# Patient Record
Sex: FEMALE | Race: WHITE | Employment: OTHER | URBAN - METROPOLITAN AREA
[De-identification: names, ages, dates, MRNs, and addresses within clinical notes are randomized per-mention and may not be internally consistent; named-entity substitution may affect disease eponyms.]

---

## 2018-10-11 ENCOUNTER — HOSPITAL ENCOUNTER (INPATIENT)
Facility: CLINIC | Age: 68
LOS: 3 days | Discharge: HOME | DRG: 178 | End: 2018-10-14
Attending: INTERNAL MEDICINE | Admitting: INTERNAL MEDICINE
Payer: MEDICARE

## 2018-10-11 ENCOUNTER — HOSPITAL ENCOUNTER (EMERGENCY)
Facility: CLINIC | Age: 68
Discharge: ANOTHER HOSPITAL | End: 2018-10-11
Attending: EMERGENCY MEDICINE | Admitting: EMERGENCY MEDICINE
Payer: MEDICARE

## 2018-10-11 ENCOUNTER — APPOINTMENT (OUTPATIENT)
Dept: CT IMAGING | Facility: CLINIC | Age: 68
End: 2018-10-11
Attending: EMERGENCY MEDICINE
Payer: MEDICARE

## 2018-10-11 VITALS
OXYGEN SATURATION: 98 % | RESPIRATION RATE: 16 BRPM | TEMPERATURE: 97.5 F | SYSTOLIC BLOOD PRESSURE: 126 MMHG | WEIGHT: 110 LBS | DIASTOLIC BLOOD PRESSURE: 62 MMHG

## 2018-10-11 DIAGNOSIS — J98.4 CAVITATING MASS OF LUNG: ICD-10-CM

## 2018-10-11 DIAGNOSIS — R91.8 LUNG MASS: ICD-10-CM

## 2018-10-11 DIAGNOSIS — J18.9 PNEUMONIA DUE TO INFECTIOUS ORGANISM, UNSPECIFIED LATERALITY, UNSPECIFIED PART OF LUNG: Primary | ICD-10-CM

## 2018-10-11 DIAGNOSIS — Z72.0 TOBACCO ABUSE DISORDER: ICD-10-CM

## 2018-10-11 LAB
ABO + RH BLD: NORMAL
ABO + RH BLD: NORMAL
ALBUMIN UR-MCNC: NEGATIVE MG/DL
ANION GAP SERPL CALCULATED.3IONS-SCNC: 9 MMOL/L (ref 3–14)
APPEARANCE UR: CLEAR
BACTERIA #/AREA URNS HPF: ABNORMAL /HPF
BASOPHILS # BLD AUTO: 0.1 10E9/L (ref 0–0.2)
BASOPHILS NFR BLD AUTO: 0.3 %
BILIRUB UR QL STRIP: NEGATIVE
BLD GP AB SCN SERPL QL: NORMAL
BLOOD BANK CMNT PATIENT-IMP: NORMAL
BUN SERPL-MCNC: 17 MG/DL (ref 7–30)
CALCIUM SERPL-MCNC: 9 MG/DL (ref 8.5–10.1)
CHLORIDE SERPL-SCNC: 99 MMOL/L (ref 94–109)
CO2 SERPL-SCNC: 25 MMOL/L (ref 20–32)
COLOR UR AUTO: YELLOW
CREAT SERPL-MCNC: 0.92 MG/DL (ref 0.52–1.04)
DIFFERENTIAL METHOD BLD: ABNORMAL
EOSINOPHIL # BLD AUTO: 0 10E9/L (ref 0–0.7)
EOSINOPHIL NFR BLD AUTO: 0.1 %
ERYTHROCYTE [DISTWIDTH] IN BLOOD BY AUTOMATED COUNT: 12.4 % (ref 10–15)
GFR SERPL CREATININE-BSD FRML MDRD: 61 ML/MIN/1.7M2
GLUCOSE SERPL-MCNC: 161 MG/DL (ref 70–99)
GLUCOSE UR STRIP-MCNC: NEGATIVE MG/DL
HCT VFR BLD AUTO: 40.4 % (ref 35–47)
HEMOCCULT STL QL: NEGATIVE
HGB BLD-MCNC: 13.3 G/DL (ref 11.7–15.7)
HGB UR QL STRIP: ABNORMAL
IMM GRANULOCYTES # BLD: 0.3 10E9/L (ref 0–0.4)
IMM GRANULOCYTES NFR BLD: 1.8 %
INR PPP: 1.09 (ref 0.86–1.14)
KETONES UR STRIP-MCNC: NEGATIVE MG/DL
LEUKOCYTE ESTERASE UR QL STRIP: ABNORMAL
LYMPHOCYTES # BLD AUTO: 1.9 10E9/L (ref 0.8–5.3)
LYMPHOCYTES NFR BLD AUTO: 10.1 %
MCH RBC QN AUTO: 30 PG (ref 26.5–33)
MCHC RBC AUTO-ENTMCNC: 32.9 G/DL (ref 31.5–36.5)
MCV RBC AUTO: 91 FL (ref 78–100)
MONOCYTES # BLD AUTO: 1.5 10E9/L (ref 0–1.3)
MONOCYTES NFR BLD AUTO: 8.2 %
MUCOUS THREADS #/AREA URNS LPF: PRESENT /LPF
NEUTROPHILS # BLD AUTO: 14.8 10E9/L (ref 1.6–8.3)
NEUTROPHILS NFR BLD AUTO: 79.5 %
NITRATE UR QL: NEGATIVE
NRBC # BLD AUTO: 0 10*3/UL
NRBC BLD AUTO-RTO: 0 /100
PH UR STRIP: 6 PH (ref 5–7)
PLATELET # BLD AUTO: 379 10E9/L (ref 150–450)
POTASSIUM SERPL-SCNC: 3.8 MMOL/L (ref 3.4–5.3)
PROCALCITONIN SERPL-MCNC: 0.21 NG/ML
RBC # BLD AUTO: 4.43 10E12/L (ref 3.8–5.2)
RBC #/AREA URNS AUTO: <1 /HPF (ref 0–2)
SODIUM SERPL-SCNC: 133 MMOL/L (ref 133–144)
SOURCE: ABNORMAL
SP GR UR STRIP: 1.01 (ref 1–1.03)
SPECIMEN EXP DATE BLD: NORMAL
SQUAMOUS #/AREA URNS AUTO: 1 /HPF (ref 0–1)
UROBILINOGEN UR STRIP-MCNC: 0 MG/DL (ref 0–2)
WBC # BLD AUTO: 18.6 10E9/L (ref 4–11)
WBC #/AREA URNS AUTO: 5 /HPF (ref 0–5)

## 2018-10-11 PROCEDURE — 82272 OCCULT BLD FECES 1-3 TESTS: CPT | Performed by: EMERGENCY MEDICINE

## 2018-10-11 PROCEDURE — 96365 THER/PROPH/DIAG IV INF INIT: CPT

## 2018-10-11 PROCEDURE — 12000000 ZZH R&B MED SURG/OB

## 2018-10-11 PROCEDURE — 81001 URINALYSIS AUTO W/SCOPE: CPT | Performed by: EMERGENCY MEDICINE

## 2018-10-11 PROCEDURE — 85025 COMPLETE CBC W/AUTO DIFF WBC: CPT | Performed by: EMERGENCY MEDICINE

## 2018-10-11 PROCEDURE — 96367 TX/PROPH/DG ADDL SEQ IV INF: CPT

## 2018-10-11 PROCEDURE — 87070 CULTURE OTHR SPECIMN AEROBIC: CPT | Performed by: INTERNAL MEDICINE

## 2018-10-11 PROCEDURE — 99223 1ST HOSP IP/OBS HIGH 75: CPT | Mod: AI | Performed by: INTERNAL MEDICINE

## 2018-10-11 PROCEDURE — 25000128 H RX IP 250 OP 636: Performed by: EMERGENCY MEDICINE

## 2018-10-11 PROCEDURE — 80048 BASIC METABOLIC PNL TOTAL CA: CPT | Performed by: EMERGENCY MEDICINE

## 2018-10-11 PROCEDURE — 84145 PROCALCITONIN (PCT): CPT | Performed by: EMERGENCY MEDICINE

## 2018-10-11 PROCEDURE — 87205 SMEAR GRAM STAIN: CPT | Performed by: INTERNAL MEDICINE

## 2018-10-11 PROCEDURE — 71260 CT THORAX DX C+: CPT

## 2018-10-11 PROCEDURE — 87040 BLOOD CULTURE FOR BACTERIA: CPT | Performed by: EMERGENCY MEDICINE

## 2018-10-11 PROCEDURE — 86850 RBC ANTIBODY SCREEN: CPT | Performed by: EMERGENCY MEDICINE

## 2018-10-11 PROCEDURE — 86901 BLOOD TYPING SEROLOGIC RH(D): CPT | Performed by: EMERGENCY MEDICINE

## 2018-10-11 PROCEDURE — 99285 EMERGENCY DEPT VISIT HI MDM: CPT | Mod: 25

## 2018-10-11 PROCEDURE — 86900 BLOOD TYPING SEROLOGIC ABO: CPT | Performed by: EMERGENCY MEDICINE

## 2018-10-11 PROCEDURE — 36415 COLL VENOUS BLD VENIPUNCTURE: CPT | Performed by: EMERGENCY MEDICINE

## 2018-10-11 PROCEDURE — 70450 CT HEAD/BRAIN W/O DYE: CPT

## 2018-10-11 PROCEDURE — 85610 PROTHROMBIN TIME: CPT | Performed by: EMERGENCY MEDICINE

## 2018-10-11 RX ORDER — HYDROCODONE BITARTRATE AND ACETAMINOPHEN 5; 325 MG/1; MG/1
1-2 TABLET ORAL EVERY 4 HOURS PRN
Status: DISCONTINUED | OUTPATIENT
Start: 2018-10-11 | End: 2018-10-14 | Stop reason: HOSPADM

## 2018-10-11 RX ORDER — LIDOCAINE 40 MG/G
CREAM TOPICAL
Status: DISCONTINUED | OUTPATIENT
Start: 2018-10-11 | End: 2018-10-14 | Stop reason: HOSPADM

## 2018-10-11 RX ORDER — POTASSIUM CL/LIDO/0.9 % NACL 10MEQ/0.1L
10 INTRAVENOUS SOLUTION, PIGGYBACK (ML) INTRAVENOUS
Status: DISCONTINUED | OUTPATIENT
Start: 2018-10-11 | End: 2018-10-14 | Stop reason: HOSPADM

## 2018-10-11 RX ORDER — ONDANSETRON 2 MG/ML
4 INJECTION INTRAMUSCULAR; INTRAVENOUS EVERY 6 HOURS PRN
Status: DISCONTINUED | OUTPATIENT
Start: 2018-10-11 | End: 2018-10-14 | Stop reason: HOSPADM

## 2018-10-11 RX ORDER — AMOXICILLIN 250 MG
2 CAPSULE ORAL 2 TIMES DAILY
Status: DISCONTINUED | OUTPATIENT
Start: 2018-10-11 | End: 2018-10-14 | Stop reason: HOSPADM

## 2018-10-11 RX ORDER — POTASSIUM CHLORIDE 1.5 G/1.58G
20-40 POWDER, FOR SOLUTION ORAL
Status: DISCONTINUED | OUTPATIENT
Start: 2018-10-11 | End: 2018-10-14 | Stop reason: HOSPADM

## 2018-10-11 RX ORDER — AMOXICILLIN 250 MG
1 CAPSULE ORAL 2 TIMES DAILY PRN
Status: DISCONTINUED | OUTPATIENT
Start: 2018-10-11 | End: 2018-10-14 | Stop reason: HOSPADM

## 2018-10-11 RX ORDER — PIPERACILLIN SODIUM, TAZOBACTAM SODIUM 3; .375 G/15ML; G/15ML
3.38 INJECTION, POWDER, LYOPHILIZED, FOR SOLUTION INTRAVENOUS EVERY 6 HOURS
Status: DISCONTINUED | OUTPATIENT
Start: 2018-10-11 | End: 2018-10-14 | Stop reason: HOSPADM

## 2018-10-11 RX ORDER — MAGNESIUM SULFATE HEPTAHYDRATE 40 MG/ML
4 INJECTION, SOLUTION INTRAVENOUS EVERY 4 HOURS PRN
Status: DISCONTINUED | OUTPATIENT
Start: 2018-10-11 | End: 2018-10-14 | Stop reason: HOSPADM

## 2018-10-11 RX ORDER — HYDROMORPHONE HYDROCHLORIDE 1 MG/ML
0.2 INJECTION, SOLUTION INTRAMUSCULAR; INTRAVENOUS; SUBCUTANEOUS
Status: DISCONTINUED | OUTPATIENT
Start: 2018-10-11 | End: 2018-10-14 | Stop reason: HOSPADM

## 2018-10-11 RX ORDER — SODIUM CHLORIDE 9 MG/ML
INJECTION, SOLUTION INTRAVENOUS CONTINUOUS
Status: DISCONTINUED | OUTPATIENT
Start: 2018-10-11 | End: 2018-10-12

## 2018-10-11 RX ORDER — ONDANSETRON 4 MG/1
4 TABLET, ORALLY DISINTEGRATING ORAL EVERY 6 HOURS PRN
Status: DISCONTINUED | OUTPATIENT
Start: 2018-10-11 | End: 2018-10-14 | Stop reason: HOSPADM

## 2018-10-11 RX ORDER — AMOXICILLIN 250 MG
2 CAPSULE ORAL 2 TIMES DAILY PRN
Status: DISCONTINUED | OUTPATIENT
Start: 2018-10-11 | End: 2018-10-14 | Stop reason: HOSPADM

## 2018-10-11 RX ORDER — ACETAMINOPHEN 325 MG/1
650 TABLET ORAL EVERY 4 HOURS PRN
Status: DISCONTINUED | OUTPATIENT
Start: 2018-10-11 | End: 2018-10-14 | Stop reason: HOSPADM

## 2018-10-11 RX ORDER — POTASSIUM CHLORIDE 1500 MG/1
20-40 TABLET, EXTENDED RELEASE ORAL
Status: DISCONTINUED | OUTPATIENT
Start: 2018-10-11 | End: 2018-10-14 | Stop reason: HOSPADM

## 2018-10-11 RX ORDER — AMOXICILLIN 250 MG
1 CAPSULE ORAL 2 TIMES DAILY
Status: DISCONTINUED | OUTPATIENT
Start: 2018-10-11 | End: 2018-10-14 | Stop reason: HOSPADM

## 2018-10-11 RX ORDER — POTASSIUM CHLORIDE 29.8 MG/ML
20 INJECTION INTRAVENOUS
Status: DISCONTINUED | OUTPATIENT
Start: 2018-10-11 | End: 2018-10-14 | Stop reason: HOSPADM

## 2018-10-11 RX ORDER — DIPHENHYDRAMINE HYDROCHLORIDE 50 MG/ML
25 INJECTION INTRAMUSCULAR; INTRAVENOUS ONCE
Status: DISCONTINUED | OUTPATIENT
Start: 2018-10-11 | End: 2018-10-11

## 2018-10-11 RX ORDER — VANCOMYCIN HYDROCHLORIDE 1 G/200ML
1000 INJECTION, SOLUTION INTRAVENOUS ONCE
Status: COMPLETED | OUTPATIENT
Start: 2018-10-11 | End: 2018-10-11

## 2018-10-11 RX ORDER — IOPAMIDOL 755 MG/ML
500 INJECTION, SOLUTION INTRAVASCULAR ONCE
Status: COMPLETED | OUTPATIENT
Start: 2018-10-11 | End: 2018-10-11

## 2018-10-11 RX ORDER — POTASSIUM CHLORIDE 7.45 MG/ML
10 INJECTION INTRAVENOUS
Status: DISCONTINUED | OUTPATIENT
Start: 2018-10-11 | End: 2018-10-14 | Stop reason: HOSPADM

## 2018-10-11 RX ORDER — NALOXONE HYDROCHLORIDE 0.4 MG/ML
.1-.4 INJECTION, SOLUTION INTRAMUSCULAR; INTRAVENOUS; SUBCUTANEOUS
Status: DISCONTINUED | OUTPATIENT
Start: 2018-10-11 | End: 2018-10-14 | Stop reason: HOSPADM

## 2018-10-11 RX ORDER — ACETAMINOPHEN 325 MG/1
325-650 TABLET ORAL EVERY 8 HOURS PRN
COMMUNITY

## 2018-10-11 RX ADMIN — VANCOMYCIN HYDROCHLORIDE 1000 MG: 1 INJECTION, SOLUTION INTRAVENOUS at 15:45

## 2018-10-11 RX ADMIN — SODIUM CHLORIDE 72 ML: 9 INJECTION, SOLUTION INTRAVENOUS at 13:40

## 2018-10-11 RX ADMIN — IOPAMIDOL 54 ML: 755 INJECTION, SOLUTION INTRAVENOUS at 13:40

## 2018-10-11 RX ADMIN — TAZOBACTAM SODIUM AND PIPERACILLIN SODIUM 3.38 G: 375; 3 INJECTION, SOLUTION INTRAVENOUS at 15:20

## 2018-10-11 ASSESSMENT — ENCOUNTER SYMPTOMS
DIZZINESS: 1
APPETITE CHANGE: 1
BACK PAIN: 1
BLOOD IN STOOL: 1
FATIGUE: 1
COUGH: 1
DIARRHEA: 1
VOMITING: 1
HEADACHES: 0

## 2018-10-11 NOTE — PHARMACY-ADMISSION MEDICATION HISTORY
Admission medication history interview status for this patient is complete. See [facility] admission navigator for allergy information, prior to admission medications and immunization status.     Medication history interview source(s):Patient and Family  Medication history resources (including written lists, pill bottles, clinic record):None  Primary pharmacy:-    Changes made to PTA medication list:  Added: tylenol  Deleted: -  Changed: -    Actions taken by pharmacist (provider contacted, etc):None     Additional medication history information:None    Medication reconciliation/reorder completed by provider prior to medication history? No    Do you take OTC medications (eg tylenol, ibuprofen, fish oil, eye/ear drops, etc)? yes(Y/N)    For patients on insulin therapy: no (Y/N)  Lantus/levemir/NPH/Mix 70/30 dose:   (Y/N) (see Med list for doses)   Sliding scale Novolog Y/N  If Yes, do you have a baseline novolog pre-meal dose:  units with meals  Patients eat three meals a day:   Y/N    How many episodes of hypoglycemia do you have per week: _______  How many missed doses do you have per week: ______  How many times do you check your blood glucose per day: _______  Do you have a Continuous glucose monitor (CGM)   Y/N (remind pt that not approved for hospital use)   Any Barriers to therapy - Be specific :  cost of medications, comfortable with giving injections (if applicable), comfortable and confident with current diabetes regimen: Y/N ______________      Prior to Admission medications    Medication Sig Last Dose Taking? Auth Provider   acetaminophen (TYLENOL) 325 MG tablet Take 325-650 mg by mouth every 8 hours as needed for mild pain 10/11/2018 at Unknown time Yes Unknown, Entered By History

## 2018-10-11 NOTE — IP AVS SNAPSHOT
MRN:[id number]                      After Visit Summary   10/11/2018    Stu Pastrana    MRN: [id number]           Thank you!     Thank you for choosing [facility] for your care. Our goal is always to provide you with excellent care. Hearing back from our patients is one way we can continue to improve our services. Please take a few minutes to complete the written survey that you may receive in the mail after you visit with us. Thank you!        Patient Information     Date Of Birth          1950        Designated Caregiver       Most Recent Value    Caregiver    Will someone help with your care after discharge? no      About your hospital stay     You were admitted on:  October 11, 2018 You last received care in the:  [place] [address] [facility]    You were discharged on:  October 14, 2018        Reason for your hospital stay       Pneumonia with cavitary lung lesion.                  Who to Call     For medical emergencies, please call 911.  For non-urgent questions about your medical care, please call your primary care provider or clinic, None          Attending Provider     Provider Specialty    Edwige Rausch MD Internal Medicine    Naima Higginbotham MD Internal Medicine       Primary Care Provider Fax #    Physician No Ref-Primary [phone]      After Care Instructions     Activity       Your activity upon discharge: activity as tolerated            Diet       Follow this diet upon discharge: Orders Placed This Encounter      Snacks/Supplements Adult: Boost Plus; With Meals      Combination Diet Regular Diet Adult                    Follow-up Appointments     Follow-up and recommended labs and tests       Follow up with primary care provider through [facility] within 7 days for hospital follow- up and for referral to Neurology at that time for right sided paresthesias; CBC recommended.  Follow up with Dr. Christophe Black, Pulmonology, in [place] or [place]  "([phone]) - 2-3 weeks.                  Pending Results     Date and Time Order Name Status Description    10/14/2018 0000 AFB Culture Non Blood In process     10/14/2018 0000 AFB Stain Non Blood In process     10/13/2018 0000 AFB Stain Non Blood In process     10/12/2018 1320 AFB Culture Non Blood In process     10/12/2018 1319 Fungal Antibodies In process     10/12/2018 1253 AFB Culture Non Blood In process     10/12/2018 0931 Quantiferon TB Gold Plus In process     10/12/2018 0752 AFB Stain Non Blood In process     10/11/2018 1425 Blood culture Preliminary     10/11/2018 1425 Blood culture Preliminary             Statement of Approval     Ordered          10/14/18 0926  I have reviewed and agree with all the recommendations and orders detailed in this document.  EFFECTIVE NOW     Approved and electronically signed by:  Flavio Royal MD             Admission Information     Date & Time Provider Department Dept. Phone    10/11/2018 Naima Higginbotham MD [place] [address] [facility] [phone]      Your Vitals Were     Blood Pressure Pulse Temperature Respirations Height Weight    124/53 (BP Location: Right arm) 82 98.1  F (36.7  C) (Oral) 18 1.575 m (5' 2\") 49.7 kg (109 lb 9.6 oz)    Pulse Oximetry BMI (Body Mass Index)                93% 20.05 kg/m2          [employer] Information     [employer] lets you send messages to your doctor, view your test results, renew your prescriptions, schedule appointments and more. To sign up, go to [email or web address] . Click on \"Log in\" on the left side of the screen, which will take you to the Welcome page. Then click on \"Sign up Now\" on the right side of the page.     You will be asked to enter the access code listed below, as well as some personal information. Please follow the directions to create your username and password.     Your access code is: [email or web address]  Expires: 2019  4:03 PM     Your access code will  in 90 days. If you need " help or a new code, please call your [facility] clinic or [phone].        Care EveryWhere ID     This is your Care EveryWhere ID. This could be used by other organizations to access your [facility] medical records  [id number]        Equal Access to Services     GIOVANI DELUNA : Hadii aad ku hadcristianmay Socamden, waaxda luqadaha, qaybta kaalmada adebrigidda, sandra mario perryjudah lyonsnajma dineroelsa erickson. So [facility] [phone].    ATENCIÓN: Si habla español, tiene a jimenez disposición servicios gratuitos de asistencia lingüística. Llame al [phone].    We comply with applicable federal civil rights laws and [place] laws. We do not discriminate on the basis of race, color, national origin, age, disability, sex, sexual orientation, or gender identity.               Review of your medicines      START taking        Dose / Directions    albuterol 108 (90 Base) MCG/ACT inhaler   Commonly known as:  PROAIR HFA        Dose:  2 puff   Inhale 2 puffs into the lungs every 4 hours as needed for shortness of breath / dyspnea or wheezing   Quantity:  1 Inhaler   Refills:  3       amoxicillin-clavulanate 875-125 MG per tablet   Commonly known as:  AUGMENTIN        Dose:  1 tablet   Take 1 tablet by mouth 2 times daily   Quantity:  60 tablet   Refills:  0       nicotine polacrilex 2 MG gum   Commonly known as:  NICORETTE        Dose:  2 mg   Place 1 each (2 mg) inside cheek every hour as needed for other (nicotine withdrawal symptoms)   Quantity:  100 tablet   Refills:  3         CONTINUE these medicines which have NOT CHANGED        Dose / Directions    acetaminophen 325 MG tablet   Commonly known as:  TYLENOL        Dose:  325-650 mg   Take 325-650 mg by mouth every 8 hours as needed for mild pain   Refills:  0            Where to get your medicines      These medications were sent to [facility] - [address]  [address] [address]     Phone:  [phone]     albuterol 108 (90 Base)  MCG/ACT inhaler    amoxicillin-clavulanate 875-125 MG per tablet    nicotine polacrilex 2 MG gum                Protect others around you: Learn how to safely use, store and throw away your medicines at www.disposemymeds.org.        ANTIBIOTIC INSTRUCTION     You've Been Prescribed an Antibiotic - Now What?  Your healthcare team thinks that you or your loved one might have an infection. Some infections can be treated with antibiotics, which are powerful, life-saving drugs. Like all medications, antibiotics have side effects and should only be used when necessary. There are some important things you should know about your antibiotic treatment.      Your healthcare team may run tests before you start taking an antibiotic.    Your team may take samples (e.g., from your blood, urine or other areas) to run tests to look for bacteria. These test can be important to determine if you need an antibiotic at all and, if you do, which antibiotic will work best.      Within a few days, your healthcare team might change or even stop your antibiotic.    Your team may start you on an antibiotic while they are working to find out what is making you sick.    Your team might change your antibiotic because test results show that a different antibiotic would be better to treat your infection.    In some cases, once your team has more information, they learn that you do not need an antibiotic at all. They may find out that you don't have an infection, or that the antibiotic you're taking won't work against your infection. For example, an infection caused by a virus can't be treated with antibiotics. Staying on an antibiotic when you don't need it is more likely to be harmful than helpful.      You may experience side effects from your antibiotic.    Like all medications, antibiotics have side effects. Some of these can be serious.    Let you healthcare team know if you have any known allergies when you are admitted to the  hospital.    One significant side effect of nearly all antibiotics is the risk of severe and sometimes deadly diarrhea caused by Clostridium difficile (C. Difficile). This occurs when a person takes antibiotics because some good germs are destroyed. Antibiotic use allows C. diificile to take over, putting patients at high risk for this serious infection.    As a patient or caregiver, it is important to understand your or your loved one's antibiotic treatment. It is especially important for caregivers to speak up when patients can't speak for themselves. Here are some important questions to ask your healthcare team.    What infection is this antibiotic treating and how do you know I have that infection?    What side effects might occur from this antibiotic?    How long will I need to take this antibiotic?    Is it safe to take this antibiotic with other medications or supplements (e.g., vitamins) that I am taking?     Are there any special directions I need to know about taking this antibiotic? For example, should I take it with food?    How will I be monitored to know whether my infection is responding to the antibiotic?    What tests may help to make sure the right antibiotic is prescribed for me?      Information provided by:  www.cdc.gov/getsmart  U.S. Department of Health and Human Services  Centers for disease Control and Prevention  National Center for Emerging and Zoonotic Infectious Diseases  Division of Healthcare Quality Promotion             Medication List: This is a list of all your medications and when to take them. Check marks below indicate your daily home schedule. Keep this list as a reference.      Medications           Morning Afternoon Evening Bedtime As Needed    acetaminophen 325 MG tablet   Commonly known as:  TYLENOL   Take 325-650 mg by mouth every 8 hours as needed for mild pain                                albuterol 108 (90 Base) MCG/ACT inhaler   Commonly known as:  PROAIR HFA   Inhale  2 puffs into the lungs every 4 hours as needed for shortness of breath / dyspnea or wheezing                                amoxicillin-clavulanate 875-125 MG per tablet   Commonly known as:  AUGMENTIN   Take 1 tablet by mouth 2 times daily                                nicotine polacrilex 2 MG gum   Commonly known as:  NICORETTE   Place 1 each (2 mg) inside cheek every hour as needed for other (nicotine withdrawal symptoms)

## 2018-10-11 NOTE — ED TRIAGE NOTES
Pt presents with multiple complaints. Pt states she has had cough x3 weeks, pt has hang blood tinged cough. Pt states she has had episode of black tarry stool as well. Pt also states was dizzy today, vomited once. ABCs intact.

## 2018-10-11 NOTE — ED PROVIDER NOTES
Patient was signed out to me pending admission.  She is a 68-year-old woman who was found to have a cavitary lung lesion in the setting of leukocytosis.  She does have hemoptysis and a chronic cough.  Had generalized weakness and decline in the last 2 weeks as well.  He was given vancomycin, Zosyn, blood cultures and blood work were obtained.  Patient needs a bronch to do definitive management of her cavitary lesion and the mass on her lungs.  She will be admitted to [facility].  She is remained stable.  Discussed with hospitalist who graciously accepted.     Lakia Nelson MD  10/11/18 1638

## 2018-10-11 NOTE — ED NOTES
Pt c/o burning at iv site.  Site is red and swollen but iv flushes well without pain and able to draw blood back easily.

## 2018-10-11 NOTE — IP AVS SNAPSHOT
[facility] [facility] [address] [facility]    [address] [place] [address]    Phone:  [phone]                                       After Visit Summary   10/11/2018    Stu Pastrana    MRN: [id number]           After Visit Summary Signature Page     I have received my discharge instructions, and my questions have been answered. I have discussed any challenges I see with this plan with the nurse or doctor.    ..........................................................................................................................................  Patient/Patient Representative Signature      ..........................................................................................................................................  Patient Representative Print Name and Relationship to Patient    ..................................................               ................................................  Date                                   Time    ..........................................................................................................................................  Reviewed by Signature/Title    ...................................................              ..............................................  Date                                               Time          22EPIC Rev 08/18

## 2018-10-11 NOTE — ED PROVIDER NOTES
History     Chief Complaint:  Multiple Complaints     HPI   Stu Pastrana is a 68 year old female, smoker with history of myocardial infarction, who presents to the emergency department today for evaluation of multiple complaints involving hemoptysis, right sided tingling, and bloody diarrhea. Patient asserts symptoms of right sided tingling that originated in her right big toe one evening two months ago, causing her to rouse from sleep due to severe pain, and she describes in minutes it radiated anteriorly through her right sided extremities up to her right shoulder and it has been a prevalent complaint for the past two months. Patient also asserts symptoms of hemoptysis where she endorses intermittent coughs with productive phlegm coated with blood (no clots) for the past two weeks. At initial onset, patient notes it was more red and she currently notes is more purple in color but she can taste and smell the blood during episodes of cough. Earlier today, patient states she felt dizzy and subsequently vomited chunky brown emesis. In addition, patient remarks episodes of bloody diarrhea approximately once or twice a day for four days with the last occurrence two days ago. Patient claims she had a couple brown crystal for bowel movement earlier today and comments a right sided abdominal discomfort. Overall, patient reports feeling fatigued and having diminished appetite as well as experiencing an episode of back pain for which she took Tylenol for earlier today. Patient denies any headache or history of blood thinners and alcohol consumption. Of note , patient notes an episode of major bleeding related to stomach ulcer due to ibuprofen many years ago.     Allergies:  No Known Drug Allergies    Medications:    Medications reviewed. No current medications.     Past Medical History:    Medical history reviewed. No pertinent medical history.    Past Surgical History:    Appendectomy     Family History:    Family history  reviewed. No pertinent family history.     Social History:  The patient was accompanied to the ED by daughter.  Smoking Status: Current Every Day Smoker   Packs/day: 1  Smokeless Tobacco: Never Used  Alcohol Use: Negative  Marital Status: Single     Review of Systems   Constitutional: Positive for appetite change (Decreased) and fatigue.   HENT:        Blood in phelgm   Respiratory: Positive for cough.    Gastrointestinal: Positive for blood in stool, diarrhea and vomiting.         Right sided abdominal discomfort   Musculoskeletal: Positive for back pain.   Neurological: Positive for dizziness. Negative for headaches.        Right sided tingling   All other systems reviewed and are negative.    Physical Exam     Patient Vitals for the past 24 hrs:   BP Temp Temp src Heart Rate Resp SpO2 Weight   10/11/18 1445 121/60 - - - - 96 % -   10/11/18 1330 - - - - - - 49.9 kg (110 lb)   10/11/18 1230 - - - - - 96 % -   10/11/18 1229 - - - - - 95 % -   10/11/18 1228 - - - - - 96 % -   10/11/18 1227 - - - - - 94 % -   10/11/18 1226 - - - - - 95 % -   10/11/18 1225 - - - - - 96 % -   10/11/18 1224 - - - - - 97 % -   10/11/18 1223 - - - - - 99 % -   10/11/18 1222 - - - - - 95 % -   10/11/18 1221 - - - - - 99 % -   10/11/18 1220 - - - - - 97 % -   10/11/18 1219 - - - - - 99 % -   10/11/18 1218 - - - - - 97 % -   10/11/18 1217 - - - - - 96 % -   10/11/18 1216 - - - - - 96 % -   10/11/18 1215 147/66 97.5  F (36.4  C) Oral 94 16 95 % -   10/11/18 1214 147/66 - - - - - -     Physical Exam    General: Patient is alert and interactive when I enter the room, thin appearing, appears older then state age  Head:  The scalp, face, and head appear normal  Eyes:  Conjunctivae are normal  ENT:    The nose is normal    Pinnae are normal    External acoustic canals are normal  Neck:  Trachea midline  CV:  Pulses are normal, RRR.    Resp:  No respiratory distress, CTAB, no murmurs    Abdomen:      Soft, non-tender, non-distended  Musc:  Normal  muscular tone    No major joint effusions    No asymmetric leg swelling  Rectal: No hemorrhoids noted, brown stool in rectal vault   Skin:  No rash or lesions noted  Neuro: Speech is normal and fluent. Face is symmetric.     Moving all extremities well. Sensation intact bilaterally. No pronator drift. No limb ataxia. Cranial nerves intact.   Psych:  Awake. Alert.  Normal affect.  Appropriate interactions.    Emergency Department Course     Imaging:  Radiology findings were communicated with the patient who voiced understanding of the findings.    CT Chest Pulmonary Embolism w Contrast  1. No CT evidence of pulmonary embolism.  2. Left lower lobe cavitary lesion abutting the left hilum with  adjacent interstitial thickening or fibrosis. Postobstructive  atelectasis or pneumonitis is also suspected posteriorly in the  lingula.  3. Right upper lobe 2.5 cm mass and adjacent interstitial thickening  or fibrosis.  4. Hepatic fatty infiltration--possible etiologies include consumption  of alcohol or excessive carbohydrate intake, especially  sugar/fructose. Metabolic syndrome commonly occurs in combination with  nonalcoholic fatty liver disease. Although often reversible,  nonalcoholic fatty liver disease can progress to steatohepatitis and  Cirrhosis.  Reading per radiology    CT Head w/o Contrast  Chronic changes. No evidence for intracranial hemorrhage  or any acute process.  JUSTEN CASTANEDA MD  Reading per radiology    Laboratory:  Laboratory findings were communicated with the patient who voiced understanding of the findings.    CBC: WBC 18.6 (H), HGB 13.3,   BMP: Glucose 161 (H) o/w WNL (Creatinine 0.92)  INR: 1.09  ABO/Rh type and screen: O  Stool occult blood: Negative   Blood culture x2: Pending       Interventions:  Zosyn 3.375 g IV   Vancocin 1000 mg IV     Emergency Department Course:    1215 Nursing notes and vitals reviewed.    1219  I performed an exam of the patient as documented above.     1238 IV was  inserted and blood was drawn for laboratory testing, results above.    1335 The patient was sent for a CT while in the emergency department, results above.     1420 Recheck and update.    1453 I spoke with Dr. Quiroz regarding patient's presentation, findings, and plan of care who states she will need a transfer to [facility].      The patient provided a urine sample here in the emergency department. This was sent for laboratory testing, findings above.     I personally reviewed the imaging and laboratory results with the patient and answered all related questions prior to admission.    Impression & Plan      Medical Decision Making:  Stu Pastrana is a 68 year old female who presents to the emergency department today for evaluation of hemoptysis, melena and poor appetite. Patient presents with multiple symptoms. In terms of her possible melena, a stool occult was done which was negative. Her Hgb is normal. Thus, it seems less likely to be a GI bleed. With her hemoptysis, this could be secondary to PE, PNA or malignancy. CBC revealed leukocytosis of 18. CTPE was done which revealed a large cavitary lesion on the left and right lung mass. With her leukocytosis and cough, it could be infectious. Zosyn and Vancomycin given. No signs of severe sepsis. Patient will need to be admitted to the hospital for further work-up however we do not have pulmonology. Plan to transfer to another hospital however signed out to Dr. Newman for deciding which hospital is available to take her.     Diagnosis:    ICD-10-CM   1. Lung mass R91.8     Disposition:   The patient is transferred to [facility] for further evaluation and treatment under the care of Dr. Omar Plascenciaibcharity Disclosure:  I, Juan Flowers, am serving as a scribe at 12:25 PM on 10/11/2018 to document services personally performed by Anca Joyce MD based on my observations and the provider's statements to me.     [facility] EMERGENCY DEPARTMENT       Isiah  MD Anca  10/12/18 0806

## 2018-10-12 ENCOUNTER — APPOINTMENT (OUTPATIENT)
Dept: ULTRASOUND IMAGING | Facility: CLINIC | Age: 68
DRG: 178 | End: 2018-10-12
Attending: INTERNAL MEDICINE
Payer: MEDICARE

## 2018-10-12 LAB
ANION GAP SERPL CALCULATED.3IONS-SCNC: 7 MMOL/L (ref 3–14)
BUN SERPL-MCNC: 13 MG/DL (ref 7–30)
CALCIUM SERPL-MCNC: 8.1 MG/DL (ref 8.5–10.1)
CHLORIDE SERPL-SCNC: 105 MMOL/L (ref 94–109)
CO2 SERPL-SCNC: 26 MMOL/L (ref 20–32)
CREAT SERPL-MCNC: 0.84 MG/DL (ref 0.52–1.04)
ERYTHROCYTE [DISTWIDTH] IN BLOOD BY AUTOMATED COUNT: 13 % (ref 10–15)
GFR SERPL CREATININE-BSD FRML MDRD: 68 ML/MIN/1.7M2
GLUCOSE SERPL-MCNC: 109 MG/DL (ref 70–99)
GRAM STN SPEC: NORMAL
HCT VFR BLD AUTO: 33.8 % (ref 35–47)
HGB BLD-MCNC: 11.3 G/DL (ref 11.7–15.7)
Lab: NORMAL
MCH RBC QN AUTO: 30.3 PG (ref 26.5–33)
MCHC RBC AUTO-ENTMCNC: 33.4 G/DL (ref 31.5–36.5)
MCV RBC AUTO: 91 FL (ref 78–100)
PLATELET # BLD AUTO: 345 10E9/L (ref 150–450)
POTASSIUM SERPL-SCNC: 3.8 MMOL/L (ref 3.4–5.3)
RBC # BLD AUTO: 3.73 10E12/L (ref 3.8–5.2)
SODIUM SERPL-SCNC: 138 MMOL/L (ref 133–144)
SPECIMEN SOURCE: NORMAL
WBC # BLD AUTO: 15.9 10E9/L (ref 4–11)

## 2018-10-12 PROCEDURE — 25000125 ZZHC RX 250: Performed by: INTERNAL MEDICINE

## 2018-10-12 PROCEDURE — 87116 MYCOBACTERIA CULTURE: CPT | Performed by: INTERNAL MEDICINE

## 2018-10-12 PROCEDURE — 99233 SBSQ HOSP IP/OBS HIGH 50: CPT | Performed by: INTERNAL MEDICINE

## 2018-10-12 PROCEDURE — 36415 COLL VENOUS BLD VENIPUNCTURE: CPT | Performed by: INTERNAL MEDICINE

## 2018-10-12 PROCEDURE — 86480 TB TEST CELL IMMUN MEASURE: CPT | Performed by: INTERNAL MEDICINE

## 2018-10-12 PROCEDURE — 86698 HISTOPLASMA ANTIBODY: CPT | Performed by: INTERNAL MEDICINE

## 2018-10-12 PROCEDURE — 25000128 H RX IP 250 OP 636: Performed by: INTERNAL MEDICINE

## 2018-10-12 PROCEDURE — 99233 SBSQ HOSP IP/OBS HIGH 50: CPT | Performed by: PODIATRIST

## 2018-10-12 PROCEDURE — 87206 SMEAR FLUORESCENT/ACID STAI: CPT | Performed by: INTERNAL MEDICINE

## 2018-10-12 PROCEDURE — A9270 NON-COVERED ITEM OR SERVICE: HCPCS | Mod: GY | Performed by: INTERNAL MEDICINE

## 2018-10-12 PROCEDURE — 12000000 ZZH R&B MED SURG/OB

## 2018-10-12 PROCEDURE — 87015 SPECIMEN INFECT AGNT CONCNTJ: CPT | Performed by: INTERNAL MEDICINE

## 2018-10-12 PROCEDURE — 86612 BLASTOMYCES ANTIBODY: CPT | Performed by: INTERNAL MEDICINE

## 2018-10-12 PROCEDURE — 25000132 ZZH RX MED GY IP 250 OP 250 PS 637: Mod: GY | Performed by: INTERNAL MEDICINE

## 2018-10-12 PROCEDURE — 93926 LOWER EXTREMITY STUDY: CPT | Mod: RT

## 2018-10-12 PROCEDURE — 85027 COMPLETE CBC AUTOMATED: CPT | Performed by: INTERNAL MEDICINE

## 2018-10-12 PROCEDURE — 80048 BASIC METABOLIC PNL TOTAL CA: CPT | Performed by: INTERNAL MEDICINE

## 2018-10-12 PROCEDURE — 86606 ASPERGILLUS ANTIBODY: CPT | Performed by: INTERNAL MEDICINE

## 2018-10-12 PROCEDURE — 86635 COCCIDIOIDES ANTIBODY: CPT | Performed by: INTERNAL MEDICINE

## 2018-10-12 RX ORDER — FAMOTIDINE 20 MG/1
20 TABLET, FILM COATED ORAL 2 TIMES DAILY
Status: DISCONTINUED | OUTPATIENT
Start: 2018-10-12 | End: 2018-10-14 | Stop reason: HOSPADM

## 2018-10-12 RX ORDER — MULTIPLE VITAMINS W/ MINERALS TAB 9MG-400MCG
1 TAB ORAL DAILY
Status: DISCONTINUED | OUTPATIENT
Start: 2018-10-12 | End: 2018-10-14 | Stop reason: HOSPADM

## 2018-10-12 RX ORDER — NICOTINE 21 MG/24HR
1 PATCH, TRANSDERMAL 24 HOURS TRANSDERMAL DAILY
Status: DISCONTINUED | OUTPATIENT
Start: 2018-10-12 | End: 2018-10-12

## 2018-10-12 RX ADMIN — SODIUM CHLORIDE: 9 INJECTION, SOLUTION INTRAVENOUS at 01:13

## 2018-10-12 RX ADMIN — MULTIPLE VITAMINS W/ MINERALS TAB 1 TABLET: TAB at 14:22

## 2018-10-12 RX ADMIN — PIPERACILLIN SODIUM,TAZOBACTAM SODIUM 3.38 G: 3; .375 INJECTION, POWDER, FOR SOLUTION INTRAVENOUS at 12:47

## 2018-10-12 RX ADMIN — FAMOTIDINE 20 MG: 10 INJECTION, SOLUTION INTRAVENOUS at 09:53

## 2018-10-12 RX ADMIN — PIPERACILLIN SODIUM,TAZOBACTAM SODIUM 3.38 G: 3; .375 INJECTION, POWDER, FOR SOLUTION INTRAVENOUS at 18:59

## 2018-10-12 RX ADMIN — SODIUM CHLORIDE: 9 INJECTION, SOLUTION INTRAVENOUS at 12:32

## 2018-10-12 RX ADMIN — FAMOTIDINE 20 MG: 20 TABLET, FILM COATED ORAL at 21:11

## 2018-10-12 RX ADMIN — PIPERACILLIN SODIUM,TAZOBACTAM SODIUM 3.38 G: 3; .375 INJECTION, POWDER, FOR SOLUTION INTRAVENOUS at 01:12

## 2018-10-12 RX ADMIN — PIPERACILLIN SODIUM,TAZOBACTAM SODIUM 3.38 G: 3; .375 INJECTION, POWDER, FOR SOLUTION INTRAVENOUS at 06:47

## 2018-10-12 ASSESSMENT — ACTIVITIES OF DAILY LIVING (ADL)
ADLS_ACUITY_SCORE: 9

## 2018-10-12 NOTE — CONSULTS
[facility]    Infectious Disease Consultation     Date of Admission:  10/11/2018  Date of Consult (When I saw the patient): 10/12/18    Assessment & Plan   Stu Pastrana is a 68 year old female who was admitted on 10/11/2018.     Impression:  1. 68 y.o female current one pack per day smoker, 40 years of smoking.   2. Admitted with 2 weeks of coughing with blood stained sputum, fatigue, anorexia.   3. Found to have right upper lobe pneumonia, and a left hilar/lingula cavitary lesion/abscess with peripheral pneumonia.   4. 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     Recommendations:   Most likely the presentation is that of bacterial abscess with pneumonia though underlying malignancy given the years of smoking also in differential.   Given the history of blood tinged sputum and work in the [employer] kitchen along with cavitary lesion on the lung is being ruled out for TB.   Quantiferon for TB is pending.   AFB stains and cultures on the sputum have been ordered.   Continue on zosyn.   Ordered fungal antibodies.       Costa Wei MD    Reason for Consult   Reason for consult: I was asked by Dr. Higginbotham  to evaluate this patient for cavitary lesion of the lung.    Primary Care Physician   Physician No Ref-Primary    Chief Complaint   Coughing blood    History is obtained from the patient and medical records    History of Present Illness   Stu Pastrana is a 68 year old female Stu Pastrana is a 68 year old female with past medical history significant for smoking one pack per day for more than 40 years, who presents with 2 weeks of cough productive of blood stained sputum, anorexia, and fatigue. Denies fever, chills, night sweats, or significant weight loss recently.Seen in the emergency room at [facility], she had a CT scan of the chest found to have a  right upper lobe pneumonia, and a left hilar/lingula cavitary lesion/abscess with peripheral pneumonia.     Past Medical History   I have reviewed  this patient's medical history and updated it with pertinent information if needed.   No past medical history on file.    Past Surgical History   I have reviewed this patient's surgical history and updated it with pertinent information if needed.  Past Surgical History:   Procedure Laterality Date     APPENDECTOMY         Prior to Admission Medications   Prior to Admission Medications   Prescriptions Last Dose Informant Patient Reported? Taking?   acetaminophen (TYLENOL) 325 MG tablet 10/11/2018 at Unknown time  Yes Yes   Sig: Take 325-650 mg by mouth every 8 hours as needed for mild pain      Facility-Administered Medications: None     Allergies   Allergies   Allergen Reactions     Ibuprofen      Nuts      Newell nuts        Immunization History     There is no immunization history on file for this patient.    Social History   I have reviewed this patient's social history and updated it with pertinent information if needed. Stu Pastrana  reports that she has been smoking.  She has been smoking about 1.00 pack per day. She has never used smokeless tobacco. She reports that she does not drink alcohol.    Family History   I have reviewed this patient's family history and updated it with pertinent information if needed.   No family history on file.    Review of Systems   The 10 point Review of Systems is negative other than noted in the HPI or here.     Physical Exam   Temp: 98  F (36.7  C) Temp src: Oral BP: 123/58 Pulse: 91 Heart Rate: 91 Resp: 16 SpO2: 90 % O2 Device: None (Room air)    Vital Signs with Ranges  Temp:  [95.4  F (35.2  C)-98.6  F (37  C)] 98  F (36.7  C)  Pulse:  [91] 91  Heart Rate:  [75-94] 91  Resp:  [16-18] 16  BP: (115-159)/(52-81) 123/58  SpO2:  [90 %-99 %] 90 %  0 lbs 0 oz  Body mass index is 20.12 kg/(m^2).    GENERAL APPEARANCE:  alert and no distress, frequent coughing   EYES: Eyes grossly normal to inspection, PERRL and conjunctivae and sclerae normal  HENT: ear canals and TM's normal and  nose and mouth without ulcers or lesions  NECK: no adenopathy, no asymmetry, masses, or scars and thyroid normal to palpation  RESP: lungs clear to auscultation - no rales, rhonchi or wheezes  CV: regular rates and rhythm, normal S1 S2, no S3 or S4 and no murmur, click or rub  LYMPHATICS: normal ant/post cervical and supraclavicular nodes  ABDOMEN: soft, nontender, without hepatosplenomegaly or masses and bowel sounds normal  MS: extremities normal- no gross deformities noted  SKIN: no suspicious lesions or rashes      Data   Lab Results   Component Value Date    WBC 15.9 (H) 10/12/2018    HGB 11.3 (L) 10/12/2018    HCT 33.8 (L) 10/12/2018     10/12/2018     10/12/2018    POTASSIUM 3.8 10/12/2018    CHLORIDE 105 10/12/2018    CO2 26 10/12/2018    BUN 13 10/12/2018    CR 0.84 10/12/2018     (H) 10/12/2018    INR 1.09 10/11/2018       Recent Labs  Lab 10/11/18  1446   CULT No growth after 11 hours  No growth after 11 hours     Recent Labs   Lab Test  10/11/18   1446   CULT  No growth after 11 hours  No growth after 11 hours

## 2018-10-12 NOTE — H&P
[facility]    History and Physical  Hospitalist       Date of Admission:  10/11/2018    Assessment & Plan   Stu Pastrana is a 68 year old female with pmh significant for coronary artery disease 15 years ago presents as a direct admit from [facility] emergency room  For hemoptysis and cavia try lung lesion   Principal Problem:    Pulmonary cavitary lesion  Hemoptysis     Pneumonia due to infectious organism vs post obstructive     Mass of right lung    Cavitating mass of lung    She had cough with hemoptysis going on  for more than 2 weeks, she waited for medicare to come through, prior to getting checked out, no recent sick contact or uri by history , no travel outside the country, 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    quantiferon gold, sputum culture and blood culture ordered    Tobacco abuse disorder    Ongoing use    1 pack per day for past 40 plus years     No plan to quit    nicotine replacement ordered  Diarrhea with black stool     Occurred one time  1 week ago     Hemoglobin  Stable.    Patient  has a history of  Severe GI bleed - many years ago , status post surgery -gastric area following NSAID use.      DVT Prophylaxis: Low Risk/Ambulatory with no VTE prophylaxis indicated  Code Status: Full Code    Disposition: Expected discharge in 2-3 days     Naima Higginbotham MD    Primary Care Physician   Physician No Ref-Primary    Chief Complaint   Hemoptysis 2 weeks     History is obtained from  the patient    History of Present Illness   Stu Pastrana is a 68 year old female with pmh significant from coronary artery disease 14 years ago status post balloon angioplasty admitted as a direct admit from [facility] emergency room , she had hemoptysis started 2 weeks ago ,first as corey blood and now as sputum mixed with bright red blood, currently her sputum has changed into yellow color, no recent weight loss but lost 10 pounds early January, 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  In the [facility] emergency room  She had a CT chest and CT head, CT chest indicated 2 lung mass, the mass on left lobe had a cavity with air fluid level , patient  Was transferred here for pulmonary consult and possible bronchoscopy.  Off note patient  Mentioned about 2 days of diarrhea with black stool, currently no color change noted in the stool , no recent fever or chills noted.    Past Medical History    I have reviewed this patient's medical history and updated it with pertinent information if needed.   No past medical history on file.    Past Surgical History   I have reviewed this patient's surgical history and updated it with pertinent information if needed.  Past Surgical History:   Procedure Laterality Date     APPENDECTOMY         Prior to Admission Medications   Prior to Admission Medications   Prescriptions Last Dose Informant Patient Reported? Taking?   acetaminophen (TYLENOL) 325 MG tablet 10/11/2018 at Unknown time  Yes Yes   Sig: Take 325-650 mg by mouth every 8 hours as needed for mild pain      Facility-Administered Medications: None     Allergies   No Known Allergies    Social History   I have reviewed this patient's social history and updated it with pertinent information if needed. Stu Pastrana  reports that she has  been smoking.  She has been smoking about 1.00 pack per day. She has never used smokeless tobacco. She reports that she does not drink alcohol.    Family History   I have reviewed this patient's family history and updated it with pertinent information if needed.   No family history on file.    Review of Systems   The 10 point Review of Systems is negative other than noted in the HPI or here.    Physical Exam   Temp: 95.4  F (35.2  C) Temp src: Oral BP: 127/62   Heart Rate: 80 Resp: 16 SpO2: 95 % O2 Device: None (Room air)    Vital Signs with Ranges  Temp:  [95.4  F (35.2  C)-97.5  F (36.4  C)] 95.4  F (35.2  C)  Heart Rate:  [80-94] 80  Resp:  [16] 16  BP: (121-147)/(59-66) 127/62  SpO2:  [92 %-99 %] 95 %  0 lbs 0 oz    Constitutional:Awake, alert, cooperative, no apparent distress.thin built   Eyes: Conjunctiva and pupils examined and normal.  HEENT: Moist mucous membranes, normal dentition.  Respiratory: Clear to auscultation bilaterally, scattered crackles upper lobes   Cardiovascular: Regular rate and rhythm, normal S1 and S2, and no murmur noted.  GI: Soft, non-distended, non-tender, normal bowel sounds.  Lymph/Hematologic: No anterior cervical or supraclavicular adenopathy.  Skin: No rashes, no cyanosis, no edema.  Musculoskeletal: No joint swelling, erythema or tenderness.  Neurologic: Cranial nerves 2-12 intact, normal strength and sensation.  Psychiatric: Alert, oriented to person, place and time, no obvious anxiety or depression.    Data   Data reviewed today:  I personally reviewed   Recent Labs  Lab 10/11/18  1238   WBC 18.6*   HGB 13.3   MCV 91      INR 1.09      POTASSIUM 3.8   CHLORIDE 99   CO2 25   BUN 17   CR 0.92   ANIONGAP 9   AN 9.0   *       Imaging:  Recent Results (from the past 24 hour(s))   CT Head w/o Contrast    Narrative    CT SCAN OF THE HEAD WITHOUT CONTRAST   10/11/2018 1:45 PM     HISTORY: Right-sided numbness.     TECHNIQUE: Axial images of the head and coronal  reformations without  IV contrast material. Radiation dose for this scan was reduced using  automated exposure control, adjustment of the mA and/or kV according  to patient size, or iterative reconstruction technique.    COMPARISON: None.    FINDINGS: Mild cerebral atrophy is present. Minimal nonspecific white  matter changes are present without mass effect. 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 There is no evidence for intracranial  hemorrhage, mass effect, acute infarct, or skull fracture. Vascular  calcifications are noted.      Impression    IMPRESSION: Chronic changes. No evidence for intracranial hemorrhage  or any acute process.    JUSTEN CASTANEDA MD   CT Chest Pulmonary Embolism w Contrast    Narrative    CT CHEST PULMONARY EMBOLISM WITH CONTRAST 10/11/2018 1:47 PM     HISTORY: Hemoptysis.     CONTRAST DOSE: 54 mL Isovue-370    Radiation dose for this scan was reduced using automated exposure  control, adjustment of the mA and/or kV according to patient size, or  iterative reconstruction technique.    FINDINGS: There is a good contrast bolus within the pulmonary  arteries. No pulmonary arterial filling defect is demonstrated to  indicate pulmonary embolism. Mass within the aorta is less optimal.  There is no evidence of aortic dissection. Within the right upper  lobe, there is a mass measuring at least 2.5 x 2.2 cm with adjacent  ill-defined margins and interstitial fibrotic changes. Within the left  lower lobe, there is a cavitary lesion measuring at least 2.7 x 2.9 cm  containing an air-fluid level. This abuts the left hilum. Left hilar  calcified lymph nodes are also noted. Adjacent interstitial fibrotic  changes are also noted in this region as well. Opacity further  inferiorly within the posterior aspect of the lingula could represent  postobstructive atelectasis or pneumonia. Calcified granuloma is noted  in the left lower lobe. Bilateral pulmonary emphysematous changes are  noted. Nonenlarged  borderline mediastinal lymph nodes are noted.  Hepatic fatty infiltration is present. Splenic calcified granulomas  are noted.      Impression    IMPRESSION:  1. No CT evidence of pulmonary embolism.  2. Left lower lobe cavitary lesion abutting the left hilum with  adjacent interstitial thickening or fibrosis. Postobstructive  atelectasis or pneumonitis is also suspected posteriorly in the  lingula.  3. Right upper lobe 2.5 cm mass and adjacent interstitial thickening  or fibrosis.  4. Hepatic fatty infiltration--possible etiologies include consumption  of alcohol or excessive carbohydrate intake, especially  sugar/fructose. Metabolic syndrome commonly occurs in combination with  nonalcoholic fatty liver disease. Although often reversible,  nonalcoholic fatty liver disease can progress to steatohepatitis and  cirrhosis.    SANDY LACEY MD

## 2018-10-12 NOTE — PROGRESS NOTES
[facility] [place]    Hospitalist Progress Note    Assessment & Plan     Stu Pastrana is a 68 year old female with pmh significant for coronary artery disease 15 years ago presents as a direct admit from [facility] emergency room  For hemoptysis and cavitary lung lesion     Cavitary lesion in the lungs  -She presented with cough with hemoptysis going on for 2 weeks  -CT chest showed left lower lobe cavitary lesion abutting the left hilum with adjacent interstitial thickening or fibrosis and right upper lobe 2.5 cm mass, -differential include bacterial abscess, underlying malignancy(given history of tobacco abuse), or pulmonary TB   -Risk factor for pulmonary TB is working outside the country, 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  -Appreciate pulmonary and infectious disease input,  -Continue IV Zosyn for now, QuantiFERON gold, AFB stain for sputum has been ordered, results pending  - Continue with Airborne isolation precautions while awaiting AFB/Quantiferon results    Right sided numbness tingling :  Patient mentions that she has been having right sided numbness starting 2 months back when she noticed some pain and redness around her right toe radiating to her upper leg, right-sided chest and right arm  -Patient also expressed concerns about possible Fungal infection of the toe   -Podiatry consult  -Given her discoloration on her right great toe will check arterial ultrasound of her right lower extremity to rule out peripheral vascular disease  -Had negative head CT on admission  - patient has been told she has problems with her lumbar vertebrae with disc collapse several years back, she is also tender on her T-spine/paraspinal region and some weakness on the right noted on physical examination.  Will check MRI of her C-spine, lumbar and thoracic spine, if significant nerve compression will contact spine surgery    Tobacco abuse disorder  -Ongoing use,1 pack per day for past 40 plus years   -Continue  with nicotine replacement     Diarrhea with black stool   Occurred one time  1 week ago, unknown baseline hemoglobin, however hemoglobin decreased compared to admission  -Likely dilutional, Hemoccult stool this admission negative  -Given patient's history of  Severe GI bleed - many years ago , status post surgery -gastric area following NSAID use continue with Pepcid, changed to oral from IV at admission    Pain assessment :  Current Pain Score 10/12/2018   Patient currently in pain? denies   Pain score (0-10) -   Stu villegas pain level was assessed and she currently denies pain.      DVT Prophylaxis: Pneumatic Compression Devices  Code Status: Full Code    Disposition: Expected discharge in 2-3 days once tuberculosis ruled out.    Ericka Pritchard MD  Text page (7am - 6pm)    Interval History   Patient reports that her hemoptysis has slowed down and sputum is more yellowish/brownish in color.  She also mentioned that for the last 2 months she has been having right-sided numbness and tingling, has not been evaluated for the same.    -Data reviewed today: I reviewed all new labs and imaging results over the last 24 hours. I personally reviewed no images or EKG's today.    Physical Exam   Temp: 98  F (36.7  C) Temp src: Oral BP: 123/58 Pulse: 91 Heart Rate: 91 Resp: 16 SpO2: 90 % O2 Device: None (Room air)    Vitals:     Vital Signs with Ranges  Temp:  [95.4  F (35.2  C)-98.6  F (37  C)] 98  F (36.7  C)  Pulse:  [91] 91  Heart Rate:  [75-92] 91  Resp:  [16-18] 16  BP: (115-159)/(52-81) 123/58  SpO2:  [90 %-98 %] 90 %       Exam:  Constitutional: Awake, alert and no distress. Appears comfortable  Head: Normocephalic. No masses, lesions, tenderness or abnormalities  ENT: no neck nodes or sinus tenderness  Cardiovascular: RRR.  No  murmurs, no rub or gallop no JVD  Respiratory: Normal WOB, intermittent left-sided  crackles   Gastrointestinal: Abdomen soft, non-tender. BS normal. No masses, organomegaly  :  Deferred  Extremities: No edema , no clubbing /cyanosis , hyperkeratotic right toe, with some surrounding erythema, pulses palpable  Skin: Warm and dry, no rash  Neuro:?  Power weaker on right upper extremity compared to left      Medications       piperacillin-tazobactam  3.375 g Intravenous Q6H     senna-docusate  1 tablet Oral BID    Or     senna-docusate  2 tablet Oral BID     sodium chloride (PF)  3 mL Intracatheter Q8H       Data     Recent Labs  Lab 10/12/18  0931 10/11/18  1238   WBC 15.9* 18.6*   HGB 11.3* 13.3   MCV 91 91    379   INR  --  1.09    133   POTASSIUM 3.8 3.8   CHLORIDE 105 99   CO2 26 25   BUN 13 17   CR 0.84 0.92   ANIONGAP 7 9   AN 8.1* 9.0   * 161*       Imaging:  Recent Results (from the past 24 hour(s))   CT Head w/o Contrast    Narrative    CT SCAN OF THE HEAD WITHOUT CONTRAST   10/11/2018 1:45 PM     HISTORY: Right-sided numbness.     TECHNIQUE: Axial images of the head and coronal reformations without  IV contrast material. Radiation dose for this scan was reduced using  automated exposure control, adjustment of the mA and/or kV according  to patient size, or iterative reconstruction technique.    COMPARISON: None.    FINDINGS: Mild cerebral atrophy is present. Minimal nonspecific white  matter changes are present without mass effect. 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 There is no evidence for intracranial  hemorrhage, mass effect, acute infarct, or skull fracture. Vascular  calcifications are noted.      Impression    IMPRESSION: Chronic changes. No evidence for intracranial hemorrhage  or any acute process.    JUSTEN CASTANEDA MD   CT Chest Pulmonary Embolism w Contrast    Narrative    CT CHEST PULMONARY EMBOLISM WITH CONTRAST 10/11/2018 1:47 PM     HISTORY: Hemoptysis.     CONTRAST DOSE: 54 mL Isovue-370    Radiation dose for this scan was reduced using automated exposure  control, adjustment of the mA and/or kV according to patient size, or  iterative  reconstruction technique.    FINDINGS: There is a good contrast bolus within the pulmonary  arteries. No pulmonary arterial filling defect is demonstrated to  indicate pulmonary embolism. Mass within the aorta is less optimal.  There is no evidence of aortic dissection. Within the right upper  lobe, there is a mass measuring at least 2.5 x 2.2 cm with adjacent  ill-defined margins and interstitial fibrotic changes. Within the left  lower lobe, there is a cavitary lesion measuring at least 2.7 x 2.9 cm  containing an air-fluid level. This abuts the left hilum. Left hilar  calcified lymph nodes are also noted. Adjacent interstitial fibrotic  changes are also noted in this region as well. Opacity further  inferiorly within the posterior aspect of the lingula could represent  postobstructive atelectasis or pneumonia. Calcified granuloma is noted  in the left lower lobe. Bilateral pulmonary emphysematous changes are  noted. Nonenlarged borderline mediastinal lymph nodes are noted.  Hepatic fatty infiltration is present. Splenic calcified granulomas  are noted.      Impression    IMPRESSION:  1. No CT evidence of pulmonary embolism.  2. Left lower lobe cavitary lesion abutting the left hilum with  adjacent interstitial thickening or fibrosis. Postobstructive  atelectasis or pneumonitis is also suspected posteriorly in the  lingula.  3. Right upper lobe 2.5 cm mass and adjacent interstitial thickening  or fibrosis.  4. Hepatic fatty infiltration--possible etiologies include consumption  of alcohol or excessive carbohydrate intake, especially  sugar/fructose. Metabolic syndrome commonly occurs in combination with  nonalcoholic fatty liver disease. Although often reversible,  nonalcoholic fatty liver disease can progress to steatohepatitis and  cirrhosis.    SANDY LACEY MD

## 2018-10-12 NOTE — PLAN OF CARE
"Problem: Patient Care Overview  Goal: Plan of Care/Patient Progress Review  Outcome: Improving  VSS, O2 low 90s RA. LS clear/dim. Intermittent productive coughs with yellow thick sputum, no blood noted this shift. 2nd AFB sputum sent, result pending. A&Ox4. Up independently, steady. Denies CP/SOB. Tolerating Reg diet, poor appetite. Maintained on airborne Iso to r/o tuberculosis. On IV Zosyn q6h. IVF dc'd, but pt/family requested to finish the fluid bag that was started @ 1232, \"since we already paid for it\". Pt reported RUE and RLE tinglings for the past 2 months, denies Rt/Lt sided weakness. MD ordered MRI lumbar spine and arterial US of RLE. Discharge pending progress. RN will cont to monitor.       "

## 2018-10-12 NOTE — PLAN OF CARE
Problem: Patient Care Overview  Goal: Plan of Care/Patient Progress Review  Outcome: No Change  A&Ox4. Independent in room. VSS on room air. Remains on contact & airborne isolation, negative airflow maintained. Patient has productive cough, producing large amounts of cloudy/blood tinged, thick sputum. Lungs clear. Denies pain. IVF & IV antibiotics. ID & pulmonology consult.

## 2018-10-12 NOTE — PLAN OF CARE
Problem: Patient Care Overview  Goal: Plan of Care/Patient Progress Review  Outcome: No Change  Transfer from  88 @approx 2100, accompanied by daughters. A&Ox4, VSS on RA. Denies pain. LS clear. Frequent productive cough, sputum sample sent to lab. Up independently. Regular diet with good intake. PIV access lost, attempted access x2, flying squad paged, IVF and Abx pending access. Reports tingling to R side from toes to shoulder, not new and unchanged. Airborne and Contact precautions initiated. MDRO education needed, endorsed to oncoming nurse. Nursing will continue to monitor.

## 2018-10-12 NOTE — CONSULTS
PULMONOLOGY CONSULTATION  Date of service: 10/12/2018    [facility]  _____________________________________________________    Stu Pastrana  68 year old female  [id number]  [address] [id number]    Primary Care Provider:  Snehal Ref-Primary, Physician  Admission Date: 10/11/2018  Hospital Attending Physician: Naima Higginbotham MD    ________________________________________    CHIEF COMPLAINT : I was asked to see this patient by Dr. Billy Higginbotham for evaluation of hemoptysis, ammonia, and cavitary lesion.  Informant: Patient, EMR    HISTORY OF PRESENT ILLNESS     Stu Pastrana is a 68 year old female with past medical history significant for smoking one pack per day for more than 40 years, who presents with 2 weeks of cough productive of blood stained sputum, anorexia, and fatigue. Denies fever, chills, night sweats, or significant weight loss recently.    Seen in the emergency room at [facility], she had a CT scan of the chest demonstrating a right upper lobe pneumonia, and a left hilar/lingula cavitary lesion/abscess with peripheral pneumonia. White blood count was elevated at 18,600, and she was transferred to [facility], started on Zosyn, placed in respiratory isolation, and sputum obtained for AFB.    Sitting up in bed in no acute distress. She exhibits no significant cough during our interview. She is awake and alert, and relates a history of pneumonia as a 8-year-old on the right side, for which she has been told there is a spot on her lung on subsequent chest x-rays. No known exposure to tuberculosis. He was tested many times with negative PPDs. History of asthma; has not used any for 15 years.      CURRENT RESPIRATORY SYMPTOMS:  Sinus congestion/drainage/pain: Denies  Cough: Productive of bloody sputum.  Wheeze:  Denies  Dyspnea: Denies  Chest Discomfort: Denies  Paroxysmal nocturnal dyspnea/Orthopnea:  Denies  Fever/ Chills/ Sweats: Denies   Reflux:  "Denies  Dysphagia/Vomiting: Denies    HOME MEDICATIONS   Pulmonary meds: Denies  Home Oxygen: None  Prescriptions Prior to Admission   Medication Sig Dispense Refill Last Dose     acetaminophen (TYLENOL) 325 MG tablet Take 325-650 mg by mouth every 8 hours as needed for mild pain   10/11/2018 at Unknown time       PAST MEDICAL HISTORY    No past medical history on file.  Past Surgical History:   Procedure Laterality Date     APPENDECTOMY         ALLERGIES     Allergies   Allergen Reactions     Ibuprofen      Nuts      Reynolds nuts        SOCIAL / SUBSTANCE HISTORY     Social History     Social History     Marital status: Single     Spouse name: N/A     Number of children: N/A     Years of education: N/A     Occupational History     Not on file.     Social History Main Topics     Smoking status: Current Every Day Smoker     Packs/day: 1.00     Smokeless tobacco: Never Used     Alcohol use No      Comment: rare use     Drug use: Not on file     Sexual activity: Not on file     Other Topics Concern     Not on file     Social History Narrative     No narrative on file     Smoking:  One pack per day for more than 40 years.  Alcohol use:  Rare      FAMILY HISTORY   No family history on file. Describes a family history of lung cancer.    REVIEW OF SYSTEMS   A comprehensive review of systems was negative except for items noted in HPI/Subjective.    PHYSICAL EXAMINATION   Temp (24hrs), Av.4  F (36.3  C), Min:95.4  F (35.2  C), Max:98.6  F (37  C)     /58 (BP Location: Right arm)  Pulse 91  Temp 98  F (36.7  C) (Oral)  Resp 16  Ht 1.575 m (5' 2\")  SpO2 90%  BMI 20.12 kg/m2 room air  No intake or output data in the 24 hours ending 10/12/18 0926      No data found.     Body mass index is 20.12 kg/(m^2).      CONSTITUTIONAL/GENERAL APPEARANCE: Alert  female. No Apparent Distress.  PSYCHIATRIC: Pleasant and appropriate mood and affect. Oriented x 3.  EARS, NOSE,THROAT,MOUTH: External ears and nose overall normal. " Moist oral mucosa.   NECK: Neck appearance normal. No neck masses and the thyroid not enlarged.   RESPIRATORY: Non-labored effort. Decreased breath sounds bilaterally no wheezes or crackles.  CARDIOVASCULAR: S1, S2, regular rate and rhythm, no murmur. Extremities with no edema.  ABDOMEN: round, soft, non-tender, non-distended, no palpable masses. No presence of hernia.  LYMPHATIC: no cervical or axillary lymphadenopathy.  SKIN: Skin color was normal. No clubbing or cyanosis. No palpable skin abnormalities.    LABORATORY ASSESSMENT    No lab results found in last 7 days.    Recent Labs  Lab 10/11/18  1238   WBC 18.6*   RBC 4.43   HGB 13.3   HCT 40.4   MCV 91   MCH 30.0   MCHC 32.9   RDW 12.4          Recent Labs  Lab 10/11/18  1238      POTASSIUM 3.8   CHLORIDE 99   CO2 25   ANIONGAP 9   BUN 17   CR 0.92   GFRESTIMATED 61   GFRESTBLACK 74   AN 9.0     No lab results found in last 7 days.    Recent Labs  Lab 10/11/18  1238   INR 1.09       Additional labs and/or comments: None    IMAGING    CT scan October 11, 2018  IMPRESSION:  1. No CT evidence of pulmonary embolism.  2. Left lower lobe cavitary lesion abutting the left hilum with  adjacent interstitial thickening or fibrosis. Postobstructive  atelectasis or pneumonitis is also suspected posteriorly in the  lingula.  3. Right upper lobe 2.5 cm mass and adjacent interstitial thickening  or fibrosis.  4. Hepatic fatty infiltration--possible etiologies include consumption  of alcohol or excessive carbohydrate intake, especially  sugar/fructose. Metabolic syndrome commonly occurs in combination with  nonalcoholic fatty liver disease. Although often reversible,  nonalcoholic fatty liver disease can progress to steatohepatitis and  Cirrhosis.    Reviewed CT scan with radiology.    PFT & OTHER TESTING       ASSESSMENT / PLAN      ACTIVE HOSPITAL PROBLEMS  Principal Problem:    Pulmonary cavitary lesion  Active Problems:    Tobacco abuse disorder    Pneumonia  due to infectious organism vs post obstructive     Mass of right lung    Cavitating mass of lung      ASSESSMENT/PLAN : 68-year-old woman, long history of smoking, now presents with left-sided lung abscess, with associated pneumonia, and right-sided pneumonia. Agree with workup including sputum culture, AFB ×3, and quantiferon.  Agree with Zosyn, and if AFB are negative, she looks well in 3 days, would discharge her to home on Augmentin for one month, and follow up in our office in [place]. [phone]    Encouraged her to quit smoking.    Discussed with Dr. Pritchard and daughter    We will be happy to follow along with you. Thank you very much for allowing us to participate Ms. Pastrana's care.    Christophe Black M.D.  [facility]  [facility] [employer]  [phone]  Pager [phone]

## 2018-10-12 NOTE — CONSULTS
"CLINICAL NUTRITION SERVICES  -  ASSESSMENT NOTE      Recommendations Ordered by Registered Dietitian (RD):   Will send Boost Plus with meals  Ordered daily Thera-Vit-M     Malnutrition (10/12):   % Weight Loss:  None noted  % Intake:  </= 50% for >/= 5 days (severe malnutrition)  Subcutaneous Fat Loss:  None observed  Muscle Loss:  None observed  Fluid Retention:  None noted    Malnutrition Diagnosis: Patient does not meet two of the above criteria necessary for diagnosing malnutrition        REASON FOR ASSESSMENT  Stu Pastrana is a 68 year old female seen by Registered Dietitian for Admission Nutrition Risk Screen -  Reduced oral intake over the last month      NUTRITION HISTORY  - Information obtained from the pt.  Pt states she's had decreased intake for the past 2 weeks due to taste changes and decreased appetite.  She had N/V for 2 days PTA.  For the past 2 weeks she'd been eating small amounts, eating cereal, toast and a few comfort-type foods.      CURRENT NUTRITION ORDERS  Diet Order:     Regular     Current Intake/Tolerance:  She ordered a large breakfast but did not eat that much.  She reports she's still having issues with taste, everything tastes metallicy or bloody.      PHYSICAL FINDINGS  Observed  Unable to assess - pt in isolation  Obtained from Chart/Interdisciplinary Team  None noted    ANTHROPOMETRICS  Height: 5' 2\"  Admit Weight: 49.9 kg (110#) - reported vs actual wt?  Body mass index is 20.12 kg/(m^2).  Weight Status:  Normal BMI  IBW: 50 kg +/- 10%  % IBW: 99%  Weight History: Pt reports her usual wt is 110#.      LABS  Labs reviewed    MEDICATIONS  Medications reviewed      ASSESSED NUTRITION NEEDS PER APPROVED PRACTICE GUIDELINES:  Dosing Weight 49.9 kg - admit wt  Estimated Energy Needs: 150-1750 kcals (30-35 Kcal/Kg)  Justification: maintenance/repletion  Estimated Protein Needs: 60-75 grams protein (1.2-1.5 g pro/Kg)  Justification: Repletion    MALNUTRITION:  % Weight Loss:  None " noted  % Intake:  </= 50% for >/= 5 days (severe malnutrition)  Subcutaneous Fat Loss:  None observed  Muscle Loss:  None observed  Fluid Retention:  None noted    Malnutrition Diagnosis: Patient does not meet two of the above criteria necessary for diagnosing malnutrition      NUTRITION DIAGNOSIS:  Inadequate oral intake related to taste changes as evidenced by report of decreased po x 2 weeks      NUTRITION INTERVENTIONS  Recommendations / Nutrition Prescription  Continue current diet.      Implementation  Nutrition education: Provided education on nutritional supplements  Medical Food Supplement - will send Boost Plus with meals  Multivitamin/Mineral - ordered daily Thera-Vit-M      Nutrition Goals  Pt will consume at least 50% of meals.      MONITORING AND EVALUATION:  Progress towards goals will be monitored and evaluated per protocol and Practice Guidelines      Melissa Almeida RD  Pager [phone] (M-F)            [phone] ([facility] Hol)

## 2018-10-12 NOTE — CONSULTS
"Foot & Ankle Surgery  October 12, 2018    CC: R toe pain    I was asked to see Stu Pastrana regarding the chief complaint by:  Dr. GILBERTO Pritchard    HPI:  Pt is a 68 year old female who presents with above complaint.  Admission for pulmonary issues, consult was placed for R toe pain.  Recent worsening history of toe pain, no trauma reported.  She states the pain woke her \"from a dead sleep\" and it felt like her toe was being constricted. It felt like her foot was \"inches\" thick and it took her some time to get used to walking/driving with the sensation.  She states the symptoms migrated up her leg to her hip, up to her arm.  She notes she has had fungus in the R hallux nail for some time.  She denies being diabetic but does admit to lumbosacral spine pathology, \"L-5, L-6\".  She states she has a compressed disk in the area.      ROS:   Pos for CC.  The patient denies current nausea, vomiting, chills, fevers, belly pain, calf pain, chest pain or SOB.  Complete remainder of ROS is otherwise neg.    VITALS:    Vitals:    10/11/18 2111 10/12/18 0108 10/12/18 0727 10/12/18 0748   BP: 138/67 115/52 159/81 123/58   BP Location: Right arm Left arm  Right arm   Pulse:    91   Resp: 18 18 16   Temp: 97.3  F (36.3  C) 98.6  F (37  C)  98  F (36.7  C)   TempSrc: Oral Oral  Oral   SpO2: 95% 92%  90%   Height:           PMH:  No past medical history on file.    SXHX:    Past Surgical History:   Procedure Laterality Date     APPENDECTOMY          MEDS:    Current Facility-Administered Medications   Medication     acetaminophen (TYLENOL) tablet 650 mg     HYDROcodone-acetaminophen (NORCO) 5-325 MG per tablet 1-2 tablet     HYDROmorphone (PF) (DILAUDID) injection 0.2 mg     lidocaine (LMX4) cream     lidocaine 1 % 1 mL     magnesium hydroxide (MILK OF MAGNESIA) suspension 30 mL     magnesium sulfate 4 g in 100 mL sterile water (premade)     melatonin tablet 1 mg     multivitamin, therapeutic with minerals (THERA-VIT-M) tablet 1 tablet     " naloxone (NARCAN) injection 0.1-0.4 mg     nicotine polacrilex (NICORETTE) gum 2-4 mg     ondansetron (ZOFRAN-ODT) ODT tab 4 mg    Or     ondansetron (ZOFRAN) injection 4 mg     piperacillin-tazobactam (ZOSYN) 3.375 g vial to attach to  mL bag     potassium chloride (KLOR-CON) Packet 20-40 mEq     potassium chloride 10 mEq in 100 mL intermittent infusion with 10 mg lidocaine     potassium chloride 10 mEq in 100 mL sterile water intermittent infusion (premix)     potassium chloride 20 mEq in 50 mL intermittent infusion     potassium chloride SA (K-DUR/KLOR-CON M) CR tablet 20-40 mEq     senna-docusate (SENOKOT-S;PERICOLACE) 8.6-50 MG per tablet 1 tablet    Or     senna-docusate (SENOKOT-S;PERICOLACE) 8.6-50 MG per tablet 2 tablet     senna-docusate (SENOKOT-S;PERICOLACE) 8.6-50 MG per tablet 1 tablet    Or     senna-docusate (SENOKOT-S;PERICOLACE) 8.6-50 MG per tablet 2 tablet     sodium chloride (PF) 0.9% PF flush 3 mL     sodium chloride (PF) 0.9% PF flush 3 mL       ALL:     Allergies   Allergen Reactions     Ibuprofen      Nuts      [place] nuts        FMH:  No family history on file.    SocHx:    Social History     Social History     Marital status: Single     Spouse name: N/A     Number of children: N/A     Years of education: N/A     Occupational History     Not on file.     Social History Main Topics     Smoking status: Current Every Day Smoker     Packs/day: 1.00     Smokeless tobacco: Never Used     Alcohol use No      Comment: rare use     Drug use: Not on file     Sexual activity: Not on file     Other Topics Concern     Not on file     Social History Narrative     No narrative on file           EXAMINATION:  Gen:   No apparent distress  Neuro:   A&Ox3, no deficits  Psych:    Answering questions appropriately for age and situation with normal affect  Head:    NCAT  Eye:    Visual scanning without deficit  Ear:    Response to auditory stimuli wnl  Lung:    Non-labored breathing on RA noted  Abd:     NTND per patient report  Lymph:    Neg for pitting/non-pitting edema BLE  Vasc:    Pulses palpable, CFT minimally delayed  Neuro:    Light touch sensation intact to all sensory nerve distributions with paresthesias in the R hallux/foot and ascending up to the hip without following specific/single nerve distribution.    Derm:    R hallux and lesser toe nail fungus without paronychia  MSK:    Right lower extremity - TCST and percussion of tibial nerve without pain or reproduction of symptoms.  Examination of hallux/1st MPJ neg for pathology to explain symptoms  Calf:    Neg for redness, swelling or tenderness    Assessment:  68 year old female with paresthesias right lower extremity and right upper extremity      Plan:  Discussed etiologies, anatomy and options  1.  Paresthesias right lower extremity and right upper extremity  -pulses readily palpable, no vascular issues noted  -no specific nerve distribution involved, rather global paresthesias from R great toe proximally to hip and including right upper extremity  -no clinica indication for specific nerve entrapment, and no specific MSK pain on exam  -nail fungus non-contributory   -agree with MRI of spine to assess for lumbosacral spine pathology that is likely contributing factor to symptoms.   -no specific pathology to explain symptoms, no lower extremity intervention indicated.  Recommend Ortho Spine based on MRI results  -will sign off.  Please call with questions or acute changes to patient status.        Patient's medical history was reviewed today        Ole Peter DPM FACFAS FACFAOM  Podiatric Foot & Ankle Surgeon  [facility]  [phone]

## 2018-10-13 ENCOUNTER — APPOINTMENT (OUTPATIENT)
Dept: MRI IMAGING | Facility: CLINIC | Age: 68
DRG: 178 | End: 2018-10-13
Attending: INTERNAL MEDICINE
Payer: MEDICARE

## 2018-10-13 LAB
ANION GAP SERPL CALCULATED.3IONS-SCNC: 8 MMOL/L (ref 3–14)
BACTERIA SPEC CULT: NORMAL
BUN SERPL-MCNC: 9 MG/DL (ref 7–30)
CALCIUM SERPL-MCNC: 8.2 MG/DL (ref 8.5–10.1)
CHLORIDE SERPL-SCNC: 106 MMOL/L (ref 94–109)
CO2 SERPL-SCNC: 24 MMOL/L (ref 20–32)
CREAT SERPL-MCNC: 0.78 MG/DL (ref 0.52–1.04)
ERYTHROCYTE [DISTWIDTH] IN BLOOD BY AUTOMATED COUNT: 13.2 % (ref 10–15)
GFR SERPL CREATININE-BSD FRML MDRD: 74 ML/MIN/1.7M2
GLUCOSE SERPL-MCNC: 83 MG/DL (ref 70–99)
HCT VFR BLD AUTO: 33.7 % (ref 35–47)
HGB BLD-MCNC: 11.2 G/DL (ref 11.7–15.7)
MCH RBC QN AUTO: 30.1 PG (ref 26.5–33)
MCHC RBC AUTO-ENTMCNC: 33.2 G/DL (ref 31.5–36.5)
MCV RBC AUTO: 91 FL (ref 78–100)
PLATELET # BLD AUTO: 349 10E9/L (ref 150–450)
POTASSIUM SERPL-SCNC: 4.1 MMOL/L (ref 3.4–5.3)
RBC # BLD AUTO: 3.72 10E12/L (ref 3.8–5.2)
SODIUM SERPL-SCNC: 138 MMOL/L (ref 133–144)
SPECIMEN SOURCE: NORMAL
WBC # BLD AUTO: 13.4 10E9/L (ref 4–11)

## 2018-10-13 PROCEDURE — 87158 CULTURE TYPING ADDED METHOD: CPT | Performed by: INTERNAL MEDICINE

## 2018-10-13 PROCEDURE — 36415 COLL VENOUS BLD VENIPUNCTURE: CPT | Performed by: INTERNAL MEDICINE

## 2018-10-13 PROCEDURE — 72148 MRI LUMBAR SPINE W/O DYE: CPT

## 2018-10-13 PROCEDURE — 99232 SBSQ HOSP IP/OBS MODERATE 35: CPT | Performed by: INTERNAL MEDICINE

## 2018-10-13 PROCEDURE — 25000132 ZZH RX MED GY IP 250 OP 250 PS 637: Mod: GY | Performed by: INTERNAL MEDICINE

## 2018-10-13 PROCEDURE — 87116 MYCOBACTERIA CULTURE: CPT | Performed by: INTERNAL MEDICINE

## 2018-10-13 PROCEDURE — 85027 COMPLETE CBC AUTOMATED: CPT | Performed by: INTERNAL MEDICINE

## 2018-10-13 PROCEDURE — 80048 BASIC METABOLIC PNL TOTAL CA: CPT | Performed by: INTERNAL MEDICINE

## 2018-10-13 PROCEDURE — A9270 NON-COVERED ITEM OR SERVICE: HCPCS | Mod: GY | Performed by: INTERNAL MEDICINE

## 2018-10-13 PROCEDURE — 72146 MRI CHEST SPINE W/O DYE: CPT

## 2018-10-13 PROCEDURE — 12000000 ZZH R&B MED SURG/OB

## 2018-10-13 PROCEDURE — 87149 DNA/RNA DIRECT PROBE: CPT | Performed by: INTERNAL MEDICINE

## 2018-10-13 PROCEDURE — 72141 MRI NECK SPINE W/O DYE: CPT

## 2018-10-13 PROCEDURE — 87015 SPECIMEN INFECT AGNT CONCNTJ: CPT | Performed by: INTERNAL MEDICINE

## 2018-10-13 PROCEDURE — 87206 SMEAR FLUORESCENT/ACID STAI: CPT | Performed by: INTERNAL MEDICINE

## 2018-10-13 PROCEDURE — 25000128 H RX IP 250 OP 636: Performed by: INTERNAL MEDICINE

## 2018-10-13 RX ADMIN — PIPERACILLIN SODIUM,TAZOBACTAM SODIUM 3.38 G: 3; .375 INJECTION, POWDER, FOR SOLUTION INTRAVENOUS at 13:11

## 2018-10-13 RX ADMIN — MULTIPLE VITAMINS W/ MINERALS TAB 1 TABLET: TAB at 09:01

## 2018-10-13 RX ADMIN — FAMOTIDINE 20 MG: 20 TABLET, FILM COATED ORAL at 09:01

## 2018-10-13 RX ADMIN — PIPERACILLIN SODIUM,TAZOBACTAM SODIUM 3.38 G: 3; .375 INJECTION, POWDER, FOR SOLUTION INTRAVENOUS at 00:42

## 2018-10-13 RX ADMIN — PIPERACILLIN SODIUM,TAZOBACTAM SODIUM 3.38 G: 3; .375 INJECTION, POWDER, FOR SOLUTION INTRAVENOUS at 18:34

## 2018-10-13 RX ADMIN — FAMOTIDINE 20 MG: 20 TABLET, FILM COATED ORAL at 20:47

## 2018-10-13 ASSESSMENT — ACTIVITIES OF DAILY LIVING (ADL)
ADLS_ACUITY_SCORE: 9

## 2018-10-13 NOTE — PROGRESS NOTES
"PULMONOLOGY PROGRESS NOTE  Date of service: 10/13/2018  [facility]    CC/Reason for Visit: Hemoptysis, lung abscess. Pneumonia.    SUBJECTIVE      HPI: Feels better today, would like to get out of the room. Clinically improving.    ROS: A Problem Pertinent review of systems was negative except for items noted in HPI.    Past Medical, Family, and Social/Substance History has been reviewed: No interval changes.    OBJECTIVE   /55 (BP Location: Right arm)  Pulse 75  Temp 98.5  F (36.9  C) (Oral)  Resp 16  Ht 1.575 m (5' 2\")  Wt 46.2 kg (101 lb 12.8 oz)  SpO2 90%  BMI 18.62 kg/m2 room air    Temp (24hrs), Av.6  F (37  C), Min:98.4  F (36.9  C), Max:98.8  F (37.1  C)       Intake/Output Summary (Last 24 hours) at 10/13/18 1529  Last data filed at 10/13/18 1000   Gross per 24 hour   Intake              800 ml   Output                0 ml   Net              800 ml     Patient Vitals for the past 96 hrs:   Weight   10/13/18 0710 46.2 kg (101 lb 12.8 oz)       CONSTITUTIONAL/GENERAL APPEARANCE: Alert. No Apparent Distress.  PSYCHIATRIC: Pleasant and appropriate mood and affect. Oriented x 3.  EARS, NOSE,THROAT,MOUTH: External ears and nose overall normal. Normal oral mucosa.   NECK: Neck appearance normal. No neck masses and the thyroid is not enlarged.   RESPIRATORY: Non-labored effort. Decreased breath sounds bilaterally  CARDIOVASCULAR: S1, S2, regular rate and rhythm, no no murmur. Extremities with no edema.      LABORATORY ASSESSMENT    No lab results found in last 7 days.    Recent Labs  Lab 10/13/18  0856 10/12/18  0931   WBC 13.4* 15.9*   RBC 3.72* 3.73*   HGB 11.2* 11.3*   HCT 33.7* 33.8*   MCV 91 91   MCH 30.1 30.3   MCHC 33.2 33.4   RDW 13.2 13.0    345       Recent Labs  Lab 10/13/18  0856 10/12/18  0931 10/11/18  1238    138 133   POTASSIUM 4.1 3.8 3.8   CHLORIDE 106 105 99   CO2 24 26 25   ANIONGAP 8 7 9   BUN 9 13 17   CR 0.78 0.84 0.92   GFRESTIMATED 74 68 61 "   GFRESTBLACK 89 82 74   AN 8.2* 8.1* 9.0     No lab results found in last 7 days.    Recent Labs  Lab 10/11/18  1238   INR 1.09       Additional labs and/or comments: AFBs are sent out of the hospital and not available for review. QuantiFERON is also sent out of the hospital not available for review. White blood count has been trending down.    IMAGING    CT scan October 11, 2018  IMPRESSION:  1. No CT evidence of pulmonary embolism.  2. Left lower lobe cavitary lesion abutting the left hilum with  adjacent interstitial thickening or fibrosis. Postobstructive  atelectasis or pneumonitis is also suspected posteriorly in the  lingula.  3. Right upper lobe 2.5 cm mass and adjacent interstitial thickening  or fibrosis.  4. Hepatic fatty infiltration--possible etiologies include consumption  of alcohol or excessive carbohydrate intake, especially  sugar/fructose. Metabolic syndrome commonly occurs in combination with  nonalcoholic fatty liver disease. Although often reversible,  nonalcoholic fatty liver disease can progress to steatohepatitis and  Cirrhosis.    PFT & OTHER TESTING       ASSESSMENT / PLAN      Principal Problem:    Pulmonary cavitary lesion  Active Problems:    Tobacco abuse disorder    Pneumonia due to infectious organism vs post obstructive     Mass of right lung    Cavitating mass of lung           ASSESSMENT/PLAN : 68-year-old woman, long history of smoking, now presents with left-sided lung abscess, with associated pneumonia, and right-sided pneumonia. Agree with workup including sputum culture, AFB ×3, and quantiferon.  Agree with Zosyn, and if she looks well  tomorrow,  I would discharge her to home on Augmentin for one month, and follow up in our office in [place] or in my office in [place]. [phone]    Christophe Black M.D.  [facility]  [facility]  [phone]  Pager [phone]

## 2018-10-13 NOTE — PROGRESS NOTES
[facility]    Internal Medicine Hospitalist Progress Note  10/13/2018  I evaluated patient on the above date.    Flavio Royal Jr., MD  [phone] (p)  Text Page      Assessment & Plan   Stu Pastrana is a 68 year old female with pmh significant for coronary artery disease, who presented as a direct admit from [facility] 10/11 for hemoptysis and cavitary lung lesion.     Pneumonia with left lower lobe cavitary lesion/abscess.  She presented with cough with hemoptysis going on for 2 weeks PTA. CT chest 10/11 showed left lower lobe cavitary lesion abutting the left hilum with adjacent interstitial thickening or fibrosis and right upper lobe 2.5 cm mass, differential included bacterial abscess, underlying malignancy (given history of tobacco abuse), or pulmonary TB. 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 Started on Zosyn on admit 10/11. BC's 10/11 NGTD. Seen by ID and Pulmonology. QuantiFERON gold 10/12 pending. AFB stain for sputum x 3 ordered 10/12.  - Continue Zosyn (started 10/11); plan discharge home on Augmentin x 1 month per Pulmonology rec's.  - Continue with airborne isolation precautions.  - AFB sputum stain, culture x 3 - last one 10/14.  - Monitor labs/cultures.  - Appreciate ID and Pulmonology help.     Right sided paresthesias, unclear etiology.  Patient mentioned that she has been having right sided numbness starting 2 months PTA when she noticed some pain and redness around her right toe radiating to her upper leg, right-sided chest and right arm. Arterial US right lower extremity 10/12 negative. MRI c-spine 10/13 negative. MRI t-spine 10/13 negative. MRI l-spine 10/13 negative.  - Referral to Neurology outpatient to consider EMG.     Tobacco abuse disorder  Ongoing use, 1 pack per day for past 40 plus years.  - Continue with nicotine replacement.     Anemia, suspect dilutional or chronic.  Diarrhea with black stool occurred  "one time 1 week PTA. H/o severe GI bleed many years ago requiring surgery - related to NSAIDs. No overt clinical signs of major bleeding here.   Recent Labs  Lab 10/13/18  0856 10/12/18  0931 10/11/18  1238   HGB 11.2* 11.3* 13.3   - Continue famotidine.  - Monitor CBC.  - Consider prbc transfusion if hgb </= 7.0 or if significant bleeding with hemodynamic instability or if symptomatic.    Prophylaxis.  - PCD's, ambulation.    CODE STATUS: FULL.    Dispo.  - Plan discharge home 10/14 on Augmentin after AFB sputum collection.    # Pain Assessment:  Current Pain Score 10/13/2018   Patient currently in pain? denies   Pain score (0-10) -   Stu villegas pain level was assessed and she currently denies pain.        Interval History   Doing OK. Continues to have paresthesias right upper extremity and right lower extremity.     -Data reviewed today: I reviewed all new labs and imaging over the last 24 hours. I personally reviewed no images or EKG's today.    Physical Exam   Heart Rate: 75, Blood pressure 118/55, pulse 75, temperature 98.5  F (36.9  C), temperature source Oral, resp. rate 16, height 1.575 m (5' 2\"), weight 46.2 kg (101 lb 12.8 oz), SpO2 90 %.  Vitals:    10/13/18 0710   Weight: 46.2 kg (101 lb 12.8 oz)     Vital Signs with Ranges  Temp:  [98.4  F (36.9  C)-98.8  F (37.1  C)] 98.5  F (36.9  C)  Pulse:  [75] 75  Heart Rate:  [74-77] 75  Resp:  [16] 16  BP: (116-123)/(53-56) 118/55  SpO2:  [90 %-93 %] 90 %  Patient Vitals for the past 24 hrs:   BP Temp Temp src Pulse Heart Rate Resp SpO2 Weight   10/13/18 0807 118/55 98.5  F (36.9  C) Oral 75 75 16 90 % -   10/13/18 0710 - - - - - - - 46.2 kg (101 lb 12.8 oz)   10/13/18 0054 123/56 98.8  F (37.1  C) Oral - 77 16 93 % -   10/12/18 2114 117/55 98.8  F (37.1  C) Oral - 74 16 93 % -   10/12/18 1625 116/53 98.4  F (36.9  C) Oral - 76 16 92 % -     I/O's Last 24 hours  I/O last 3 completed shifts:  In: 800 [P.O.:800]  Out: -     Constitutional: Alert, oriented, " pleasant.  Respiratory: Diminished in bases. No wheezes. Few crackles R base.  Cardiovascular: RRR no m/r/g.  GI: Soft, nt, nd, +BS.  Skin/Integumen:   Other:        Data     Recent Labs  Lab 10/13/18  0856 10/12/18  0931 10/11/18  1238   WBC 13.4* 15.9* 18.6*   HGB 11.2* 11.3* 13.3   MCV 91 91 91    345 379   INR  --   --  1.09    138 133   POTASSIUM 4.1 3.8 3.8   CHLORIDE 106 105 99   CO2 24 26 25   BUN 9 13 17   CR 0.78 0.84 0.92   ANIONGAP 8 7 9   AN 8.2* 8.1* 9.0   GLC 83 109* 161*     Recent Labs   Lab Test  10/13/18   0856  10/12/18   0931  10/11/18   1238   GLC  83  109*  161*         Recent Results (from the past 24 hour(s))   MR Cervical Spine w/o Contrast    Narrative    MRI CERVICAL SPINE WITHOUT CONTRAST 10/13/2018 3:01 AM     HISTORY: Right-sided numbness/tingling.     TECHNIQUE: Multiplanar, multisequence MRI of the cervical spine  without contrast.     COMPARISON: None.    FINDINGS: Motion artifact degrades the quality of the axial images.  The cervical spinal cord and visualized portions of the thoracic  spinal cord appear normal. The visualized portions of the brainstem  and cerebellum appear normal.  Alignment is normal.  The paraspinal  soft tissues appear normal. The bone marrow has normal signal  intensity.     Craniocervical Junction and C1-C2:  Normal.    C2-C3:  Normal disc, facet joints, spinal canal and neural foramina.    C3-C4:  Normal disc, facet joints, spinal canal and neural foramina.    C4-C5:  The disc appears normal. The central canal is normal. There  are mild-moderate degenerative changes in the facet joints.    C5-C6:  Disc has a normal appearance. The central canal is widely  patent. There are mild degenerative changes in the facet joints.    C6-C7:  The disc appears normal. The central canal and neural foramen  are patent.    C7-T1: Normal disc, facet joints, spinal canal and neural foramina.    T1-T2:  Normal disc, facet joints, spinal canal and neural  foramina.       Impression    IMPRESSION:    1. The cervical cord appears normal. The central canal is widely  patent.  2. Mild degenerative changes.    KATIE PEREZ MD   MR Thoracic Spine w/o Contrast    Narrative    MRI THORACIC SPINE WITHOUT CONTRAST  10/13/2018 3:18 AM     HISTORY: Right-sided numbness/tingling.     TECHNIQUE: Multiplanar multisequence MRI of the thoracic spine without  contrast.    COMPARISON: CT chest dated 10/11/2018.    FINDINGS:  The thoracic cord is normal in size and it has normal  signal intensity. No intradural or extradural mass lesions are seen.  No metastatic lesions are identified. There is a T1 hyperintense  lesion in the T9 vertebral body consistent with a hemangioma. No  thoracic disc herniations are seen. There is an area of consolidation  in the right upper lobe. This is better seen on the recent chest CT  scan.      Impression    IMPRESSION:    1. The thoracic cord appears normal.  2. No bone metastasis are identified.    KATIE PEREZ MD   MR Lumbar Spine w/o Contrast    Narrative    MRI LUMBAR SPINE WITHOUT CONTRAST   10/13/2018 3:42 AM     HISTORY: right sided numbness/tingling;     TECHNIQUE: Multiplanar multisequence MRI of the lumbar spine without  contrast.    COMPARISON: None.    FINDINGS: The report is dictated assuming five lumbar-type vertebral  bodies, and radiographic correlation may be necessary.  The distal  spinal cord and cauda equina appear normal. [The bone marrow appears  normal.]  [The paraspinal soft tissues appear normal.] Tarlov nerve  root cysts are seen at the S3 level within the sacrum. This is usually  an incidental finding.    T12-L1:   Normal disc, facet joints, spinal canal and neural foramina.        L1-L2:   Normal disc, facet joints, spinal canal and neural foramina.       L2-L3:   Normal disc, facet joints, spinal canal and neural foramina.       L3-L4:   Normal disc, facet joints, spinal canal and neural foramina.     L4-L5:   Normal disc, facet  joints, spinal canal and neural foramina.       L5-S1:  Minimal annular disc bulge. Mild degenerative change in the  facet joints.      Impression    IMPRESSION:    1. The conus and cauda equina appear normal. No intradural lesions are  seen.  2. No bone metastasis are identified.    KATIE PEREZ MD       Medications   All medications were reviewed.      famotidine  20 mg Oral BID     multivitamin, therapeutic with minerals  1 tablet Oral Daily     piperacillin-tazobactam  3.375 g Intravenous Q6H     senna-docusate  1 tablet Oral BID    Or     senna-docusate  2 tablet Oral BID     sodium chloride (PF)  3 mL Intracatheter Q8H

## 2018-10-13 NOTE — PLAN OF CARE
Problem: Patient Care Overview  Goal: Plan of Care/Patient Progress Review  Outcome: No Change  A&O x4. VSS on RA. Ind in room. MRI done tonight for R side tingling, results pending. Producing thick/creamy/cloudy sputum, no blood noted. LS clear, dim. Maintained Airborne iso. IV abx. Finished IVF bag this shift now SL. ID/Pulm follow.

## 2018-10-13 NOTE — PLAN OF CARE
Problem: Patient Care Overview  Goal: Plan of Care/Patient Progress Review  Outcome: Improving  VSS, O2 low 90s RA. LS clear/dim. Intermittent productive coughs, no more hemoptysis since yesterday. Denies CP/SOB. Tolerating Reg diet, fair appetite. Up independently, steady. AFB stain/AFB cultures pending. IVSL. On IV Zosyn q6h. Denies pain. Continue to c/o RUE and RLE tinglings, MRI of C-spine, lumbar and thoracic spine were normal. WBC 13.4 (15.9 10/12) improving. Possible discharge home in 1-2 days once Tuberculosis is ruled out.

## 2018-10-13 NOTE — PLAN OF CARE
Problem: Patient Care Overview  Goal: Plan of Care/Patient Progress Review  Outcome: Improving  A&Ox4.  VSS on RA.  Denies pain.  Intermittent tingling from R shoulder to R foot, baseline.  LS clear diminished.  BS normoactive, +flatus.  Infrequent, productive cough: thick cloudy, creamy sputum.  Not enough sputum for sample collection this shift.  Results pending on previous sputum samples.  Regular diet.  Independent with transfers.  Droplet and contract precautions maintained.

## 2018-10-14 VITALS
HEIGHT: 62 IN | OXYGEN SATURATION: 93 % | DIASTOLIC BLOOD PRESSURE: 53 MMHG | HEART RATE: 82 BPM | TEMPERATURE: 98.1 F | BODY MASS INDEX: 20.17 KG/M2 | SYSTOLIC BLOOD PRESSURE: 124 MMHG | WEIGHT: 109.6 LBS | RESPIRATION RATE: 18 BRPM

## 2018-10-14 LAB
ACID FAST STN SPEC QL: NORMAL
ACID FAST STN SPEC QL: NORMAL
BASOPHILS # BLD AUTO: 0.1 10E9/L (ref 0–0.2)
BASOPHILS NFR BLD AUTO: 0.3 %
DIFFERENTIAL METHOD BLD: ABNORMAL
EOSINOPHIL # BLD AUTO: 0.2 10E9/L (ref 0–0.7)
EOSINOPHIL NFR BLD AUTO: 1.6 %
ERYTHROCYTE [DISTWIDTH] IN BLOOD BY AUTOMATED COUNT: 13.2 % (ref 10–15)
HCT VFR BLD AUTO: 38.7 % (ref 35–47)
HGB BLD-MCNC: 12.9 G/DL (ref 11.7–15.7)
IMM GRANULOCYTES # BLD: 0.2 10E9/L (ref 0–0.4)
IMM GRANULOCYTES NFR BLD: 1.4 %
LYMPHOCYTES # BLD AUTO: 2.9 10E9/L (ref 0.8–5.3)
LYMPHOCYTES NFR BLD AUTO: 19.6 %
MCH RBC QN AUTO: 30.5 PG (ref 26.5–33)
MCHC RBC AUTO-ENTMCNC: 33.3 G/DL (ref 31.5–36.5)
MCV RBC AUTO: 92 FL (ref 78–100)
MONOCYTES # BLD AUTO: 1.1 10E9/L (ref 0–1.3)
MONOCYTES NFR BLD AUTO: 7.4 %
NEUTROPHILS # BLD AUTO: 10.2 10E9/L (ref 1.6–8.3)
NEUTROPHILS NFR BLD AUTO: 69.7 %
NRBC # BLD AUTO: 0 10*3/UL
NRBC BLD AUTO-RTO: 0 /100
PLATELET # BLD AUTO: 399 10E9/L (ref 150–450)
RBC # BLD AUTO: 4.23 10E12/L (ref 3.8–5.2)
SPECIMEN SOURCE: NORMAL
WBC # BLD AUTO: 14.7 10E9/L (ref 4–11)

## 2018-10-14 PROCEDURE — 25000128 H RX IP 250 OP 636: Performed by: INTERNAL MEDICINE

## 2018-10-14 PROCEDURE — 87015 SPECIMEN INFECT AGNT CONCNTJ: CPT | Performed by: INTERNAL MEDICINE

## 2018-10-14 PROCEDURE — 99239 HOSP IP/OBS DSCHRG MGMT >30: CPT | Performed by: INTERNAL MEDICINE

## 2018-10-14 PROCEDURE — 25000132 ZZH RX MED GY IP 250 OP 250 PS 637: Mod: GY | Performed by: INTERNAL MEDICINE

## 2018-10-14 PROCEDURE — 87206 SMEAR FLUORESCENT/ACID STAI: CPT | Performed by: INTERNAL MEDICINE

## 2018-10-14 PROCEDURE — 87116 MYCOBACTERIA CULTURE: CPT | Performed by: INTERNAL MEDICINE

## 2018-10-14 PROCEDURE — 85025 COMPLETE CBC W/AUTO DIFF WBC: CPT | Performed by: INTERNAL MEDICINE

## 2018-10-14 PROCEDURE — A9270 NON-COVERED ITEM OR SERVICE: HCPCS | Mod: GY | Performed by: INTERNAL MEDICINE

## 2018-10-14 PROCEDURE — 36415 COLL VENOUS BLD VENIPUNCTURE: CPT | Performed by: INTERNAL MEDICINE

## 2018-10-14 RX ORDER — ALBUTEROL SULFATE 90 UG/1
2 AEROSOL, METERED RESPIRATORY (INHALATION) EVERY 4 HOURS PRN
Qty: 1 INHALER | Refills: 3 | Status: SHIPPED | OUTPATIENT
Start: 2018-10-14

## 2018-10-14 RX ADMIN — FAMOTIDINE 20 MG: 20 TABLET, FILM COATED ORAL at 08:43

## 2018-10-14 RX ADMIN — MULTIPLE VITAMINS W/ MINERALS TAB 1 TABLET: TAB at 08:43

## 2018-10-14 RX ADMIN — PIPERACILLIN SODIUM,TAZOBACTAM SODIUM 3.38 G: 3; .375 INJECTION, POWDER, FOR SOLUTION INTRAVENOUS at 00:52

## 2018-10-14 RX ADMIN — PIPERACILLIN SODIUM,TAZOBACTAM SODIUM 3.38 G: 3; .375 INJECTION, POWDER, FOR SOLUTION INTRAVENOUS at 06:13

## 2018-10-14 ASSESSMENT — ACTIVITIES OF DAILY LIVING (ADL)
ADLS_ACUITY_SCORE: 9

## 2018-10-14 NOTE — PROGRESS NOTES
"[facility]    Internal Medicine Hospitalist Progress Note  10/14/2018  I evaluated patient on the above date.    Flavio Royal Jr., MD  [phone] (p)  Text Page      Assessment & Plan   D/C home: see discharge summary for diagnoses.    # Pain Assessment:  Current Pain Score 10/14/2018   Patient currently in pain? denies   Pain score (0-10) -   Stu villegas pain level was assessed and she currently denies pain.        Interval History   Doing OK overall. Ready to go home.    -Data reviewed today: I reviewed all new labs and imaging over the last 24 hours. I personally reviewed no images or EKG's today.    Physical Exam   Heart Rate: 82, Blood pressure 124/53, pulse 82, temperature 98.1  F (36.7  C), temperature source Oral, resp. rate 18, height 1.575 m (5' 2\"), weight 49.7 kg (109 lb 9.6 oz), SpO2 93 %.  Vitals:    10/13/18 0710 10/14/18 0654   Weight: 50.3 kg (110 lb 12.8 oz) 49.7 kg (109 lb 9.6 oz)     Vital Signs with Ranges  Temp:  [98.1  F (36.7  C)-98.5  F (36.9  C)] 98.1  F (36.7  C)  Pulse:  [78-82] 82  Heart Rate:  [74-82] 82  Resp:  [16-20] 18  BP: (121-136)/(53-66) 124/53  SpO2:  [93 %] 93 %  Patient Vitals for the past 24 hrs:   BP Temp Temp src Pulse Heart Rate Resp SpO2 Weight   10/14/18 0840 124/53 98.1  F (36.7  C) Oral 82 82 18 93 % -   10/14/18 0654 - - - - - - - 49.7 kg (109 lb 9.6 oz)   10/14/18 0057 136/66 98.5  F (36.9  C) Oral 78 - 20 93 % -   10/13/18 1607 121/58 98.3  F (36.8  C) Oral - 74 16 93 % -     I/O's Last 24 hours  I/O last 3 completed shifts:  In: 400 [P.O.:400]  Out: -     Constitutional: Alert, oriented, pleasant.  Respiratory: Diminished in bases R > L. No wheezes.  Cardiovascular:   GI:   Skin/Integumen:   Other:        Data     Recent Labs  Lab 10/13/18  0856 10/12/18  0931 10/11/18  1238   WBC 13.4* 15.9* 18.6*   HGB 11.2* 11.3* 13.3   MCV 91 91 91    345 379   INR  --   --  1.09    138 133   POTASSIUM 4.1 3.8 3.8   CHLORIDE 106 105 99   CO2 24 26 25 "   BUN 9 13 17   CR 0.78 0.84 0.92   ANIONGAP 8 7 9   AN 8.2* 8.1* 9.0   GLC 83 109* 161*     Recent Labs   Lab Test  10/13/18   0856  10/12/18   0931  10/11/18   1238   GLC  83  109*  161*         No results found for this or any previous visit (from the past 24 hour(s)).    Medications   All medications were reviewed.      famotidine  20 mg Oral BID     multivitamin, therapeutic with minerals  1 tablet Oral Daily     piperacillin-tazobactam  3.375 g Intravenous Q6H     senna-docusate  1 tablet Oral BID    Or     senna-docusate  2 tablet Oral BID     sodium chloride (PF)  3 mL Intracatheter Q8H

## 2018-10-14 NOTE — DISCHARGE SUMMARY
[facility]  Discharge Summary        Stu Pastrana MRN# [id number]   YOB: 1950 Age: 68 year old     Date of Admission: 10/11/2018  Date of Discharge: 10/14/2018  Admitting Physician: Naima Higginbotham MD  Discharge Physician: Flavio Royal MD     Primary Provider: [facility].         Discharge Diagnoses:   1. Pneumonia with left lower lobe cavitary lesion/abscess.  2. Right sided paresthesias, unclear etiology.  3. Tobacco abuse disorder  4. Anemia, suspect dilutional or chronic.       Allergies:         Allergies   Allergen Reactions     Ibuprofen      Nuts      Brazil nuts            Discharge Medications:        Current Discharge Medication List      START taking these medications    Details   albuterol (PROAIR HFA) 108 (90 Base) MCG/ACT inhaler Inhale 2 puffs into the lungs every 4 hours as needed for shortness of breath / dyspnea or wheezing  Qty: 1 Inhaler, Refills: 3    Associated Diagnoses: Pneumonia due to infectious organism, unspecified laterality, unspecified part of lung; Tobacco abuse disorder      amoxicillin-clavulanate (AUGMENTIN) 875-125 MG per tablet Take 1 tablet by mouth 2 times daily  Qty: 60 tablet, Refills: 0    Associated Diagnoses: Pneumonia due to infectious organism, unspecified laterality, unspecified part of lung; Cavitating mass of lung      nicotine polacrilex (NICORETTE) 4 MG gum Place 1 each (4 mg) inside cheek every 2 hours as needed for other (nicotine withdrawal symptoms)  Qty: 100 each, Refills: 3    Associated Diagnoses: Tobacco abuse disorder         CONTINUE these medications which have NOT CHANGED    Details   acetaminophen (TYLENOL) 325 MG tablet Take 325-650 mg by mouth every 8 hours as needed for mild pain                 Discharge Instructions and Follow-Up:      Follow-up Appointments     Follow-up and recommended labs and tests       Follow up with primary care provider through [facility] within 7 days for   hospital follow- up and for  referral to Neurology at that time for right   sided paresthesias; CBC recommended.  Follow up with Dr. Christophe Black, Pulmonology, in [facility]   ([phone]) - 2-3 weeks.                          Consultations This Hospital Stay:      1. Infectious Disease.  2. Pulmonology.        Admission History:      Please see the H&P by Naima Higginbotham MD on 10/11/2018 for complete details. Briefly, Stu Pastrana is a 68 year old female with pmh significant for coronary artery disease, who presented as a direct admit from [facility] 10/11 for hemoptysis and cavitary lung lesion.        Problem Oriented Hospital Course:      Pneumonia with left lower lobe cavitary lesion/abscess.  She presented with cough with hemoptysis going on for 2 weeks PTA. CT chest 10/11 showed left lower lobe cavitary lesion abutting the left hilum with adjacent interstitial thickening or fibrosis and right upper lobe 2.5 cm mass, differential included bacterial abscess, underlying malignancy (given history of tobacco abuse), or pulmonary TB. 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 Started on Zosyn on admit 10/11. BC's 10/11 NGTD. Seen by ID and Pulmonology. QuantiFERON gold 10/12 pending. AFB stain for sputum x 3 ordered 10/12. AFB sputum stain, culture x 3 - completed 10/14.  - Plan discharge home on Augmentin x 1 month per Pulmonology rec's.  - Monitor labs/cultures.  - F/u with Pulmonology outpatient.      Right sided paresthesias, unclear etiology.  Patient mentioned that she has been having right sided numbness starting 2 months PTA when she noticed some pain and redness around her right toe radiating to her upper leg, right-sided chest and right arm. Arterial US right lower extremity 10/12 negative. MRI c-spine 10/13 negative. MRI t-spine 10/13 negative. MRI l-spine 10/13 negative.  - Referral to Neurology outpatient to consider EMG.      Anemia, suspect dilutional or  chronic.  Diarrhea with black stool occurred one time 1 week PTA. H/o severe GI bleed many years ago requiring surgery - related to NSAIDs. No overt clinical signs of major bleeding here.     Recent Labs  Lab 10/14/18  0917 10/13/18  0856 10/12/18  0931 10/11/18  1238   HGB 12.9 11.2* 11.3* 13.3   - Monitor CBC outpatient.        Code Status:      Full Code        Pending Results:        Unresulted Labs Ordered in the Past 30 Days of this Admission     Date and Time Order Name Status Description    10/14/2018 0000 AFB Culture Non Blood In process     10/14/2018 0000 AFB Stain Non Blood In process     10/13/2018 0000 AFB Stain Non Blood In process     10/12/2018 1320 AFB Culture Non Blood In process     10/12/2018 1319 Fungal Antibodies In process     10/12/2018 1253 AFB Culture Non Blood In process     10/12/2018 0931 Quantiferon TB Gold Plus In process     10/12/2018 0752 AFB Stain Non Blood In process     10/11/2018 1425 Blood culture Preliminary     10/11/2018 1425 Blood culture Preliminary                 Discharge Disposition:      Discharged to home.        Discharge Time:      Greater than 30 minutes.        Key Imaging Studies, Lab Findings and Procedures/Surgeries:        Results for orders placed or performed during the hospital encounter of 10/11/18   US Lower Extremity Arterial rt    Narrative    ULTRASOUND LOWER EXTREMITY ARTERIAL RIGHT 10/12/2018 2:05 PM    HISTORY:  68-year-old patient with right toe pain and numbness.    COMPARISON: None.    TECHNIQUE: Color Doppler and spectral waveform analysis performed  throughout the arteries of the right lower extremity.    FINDINGS: The right common femoral, profunda femoral, superficial  femoral, and popliteal arteries are patent. Tibioperoneal trunk,  posterior tibial, peroneal, and posterior tibial arterial segments are  also patent. No visible occlusion. No velocity elevation to suggest  stenosis. Waveforms are diffusely triphasic/biphasic.      Impression     IMPRESSION: Widely patent arteries in the right lower extremity.    CHRISTIANA CAREY MD   MR Cervical Spine w/o Contrast    Narrative    MRI CERVICAL SPINE WITHOUT CONTRAST 10/13/2018 3:01 AM     HISTORY: Right-sided numbness/tingling.     TECHNIQUE: Multiplanar, multisequence MRI of the cervical spine  without contrast.     COMPARISON: None.    FINDINGS: Motion artifact degrades the quality of the axial images.  The cervical spinal cord and visualized portions of the thoracic  spinal cord appear normal. The visualized portions of the brainstem  and cerebellum appear normal.  Alignment is normal.  The paraspinal  soft tissues appear normal. The bone marrow has normal signal  intensity.     Craniocervical Junction and C1-C2:  Normal.    C2-C3:  Normal disc, facet joints, spinal canal and neural foramina.    C3-C4:  Normal disc, facet joints, spinal canal and neural foramina.    C4-C5:  The disc appears normal. The central canal is normal. There  are mild-moderate degenerative changes in the facet joints.    C5-C6:  Disc has a normal appearance. The central canal is widely  patent. There are mild degenerative changes in the facet joints.    C6-C7:  The disc appears normal. The central canal and neural foramen  are patent.    C7-T1: Normal disc, facet joints, spinal canal and neural foramina.    T1-T2:  Normal disc, facet joints, spinal canal and neural foramina.       Impression    IMPRESSION:    1. The cervical cord appears normal. The central canal is widely  patent.  2. Mild degenerative changes.    KATIE PEREZ MD   MR Thoracic Spine w/o Contrast    Narrative    MRI THORACIC SPINE WITHOUT CONTRAST  10/13/2018 3:18 AM     HISTORY: Right-sided numbness/tingling.     TECHNIQUE: Multiplanar multisequence MRI of the thoracic spine without  contrast.    COMPARISON: CT chest dated 10/11/2018.    FINDINGS:  The thoracic cord is normal in size and it has normal  signal intensity. No intradural or extradural mass lesions  are seen.  No metastatic lesions are identified. There is a T1 hyperintense  lesion in the T9 vertebral body consistent with a hemangioma. No  thoracic disc herniations are seen. There is an area of consolidation  in the right upper lobe. This is better seen on the recent chest CT  scan.      Impression    IMPRESSION:    1. The thoracic cord appears normal.  2. No bone metastasis are identified.    KATIE PEREZ MD   MR Lumbar Spine w/o Contrast    Narrative    MRI LUMBAR SPINE WITHOUT CONTRAST   10/13/2018 3:42 AM     HISTORY: right sided numbness/tingling;     TECHNIQUE: Multiplanar multisequence MRI of the lumbar spine without  contrast.    COMPARISON: None.    FINDINGS: The report is dictated assuming five lumbar-type vertebral  bodies, and radiographic correlation may be necessary.  The distal  spinal cord and cauda equina appear normal. [The bone marrow appears  normal.]  [The paraspinal soft tissues appear normal.] Tarlov nerve  root cysts are seen at the S3 level within the sacrum. This is usually  an incidental finding.    T12-L1:   Normal disc, facet joints, spinal canal and neural foramina.        L1-L2:   Normal disc, facet joints, spinal canal and neural foramina.       L2-L3:   Normal disc, facet joints, spinal canal and neural foramina.       L3-L4:   Normal disc, facet joints, spinal canal and neural foramina.     L4-L5:   Normal disc, facet joints, spinal canal and neural foramina.       L5-S1:  Minimal annular disc bulge. Mild degenerative change in the  facet joints.      Impression    IMPRESSION:    1. The conus and cauda equina appear normal. No intradural lesions are  seen.  2. No bone metastasis are identified.    KATIE PEREZ MD

## 2018-10-14 NOTE — PLAN OF CARE
Problem: Patient Care Overview  Goal: Plan of Care/Patient Progress Review  Outcome: No Change  A&O x4. VSS on RA. Ind in room. LS dim. Prod cough, no blood tinged sputum.  PIV SL. IV abx. Denied pain. Pulm follow. Potential discharge, home with PO Augmentin.

## 2018-10-14 NOTE — PLAN OF CARE
Problem: Patient Care Overview  Goal: Plan of Care/Patient Progress Review  Outcome: Improving  A&Ox4.  VSS.  Sánchez pain.  Tingling R sided, baseline.  LS clear diminished all fields.  Infrequent, productive cough with small amounts cloudy sputum.  BS +, flatus +.  Independent with transfers.  Contact/droplet precautions maintained.  One more sputum sample needed, specimen cup at bedside.  IV SL.  Plan to discharge to home tomorrow.

## 2018-10-14 NOTE — PLAN OF CARE
Problem: Patient Care Overview  Goal: Plan of Care/Patient Progress Review  Outcome: Adequate for Discharge Date Met: 10/14/18  Discharge    Patient discharged to Home with daughters  Care plan note  VSS, O2 wnl RA. LS clear/dim. Intermittent productive (yellow/thick) coughs. Denies CP/SOB. A&Ox4. Up independently. Denies pain. AFB x3 pending. IV access removed. Belongings packed and sent with the pt. Went through AVS and Discharge medication, pt verbalized understanding. Pt's daughter provided transportation.     Listed belongings gathered and returned to patient. Yes  Care Plan and Patient education resolved: Yes  Prescriptions if needed, hard copies sent with patient  Yes  Home and hospital acquired medications returned to patient: NA  Medication Bin checked and emptied on discharge Yes  Follow up appointment made for patient: Yes

## 2018-10-15 LAB
ASPERGILLUS AB TITR SER CF: NORMAL {TITER}
B DERMAT AB SER-ACNC: 0.2 IV
COCCIDIOIDES AB TITR SER CF: NORMAL {TITER}
GAMMA INTERFERON BACKGROUND BLD IA-ACNC: 0.06 IU/ML
H CAPSUL MYC AB TITR SER CF: NORMAL {TITER}
H CAPSUL YST AB TITR SER CF: NORMAL {TITER}
M TB IFN-G BLD-IMP: ABNORMAL
M TB IFN-G CD4+ BCKGRND COR BLD-ACNC: 0.47 IU/ML
MITOGEN IGNF BCKGRD COR BLD-ACNC: 0 IU/ML
MITOGEN IGNF BCKGRD COR BLD-ACNC: 0.04 IU/ML

## 2018-10-16 LAB
ACID FAST STN SPEC QL: NORMAL
ACID FAST STN SPEC QL: NORMAL
SPECIMEN SOURCE: NORMAL

## 2018-10-17 LAB
ACID FAST STN SPEC QL: NORMAL
ACID FAST STN SPEC QL: NORMAL
BACTERIA SPEC CULT: NO GROWTH
BACTERIA SPEC CULT: NO GROWTH
Lab: NORMAL
Lab: NORMAL
SPECIMEN SOURCE: NORMAL

## 2018-11-23 LAB
MYCOBACTERIUM SPEC CULT: ABNORMAL
SPECIMEN SOURCE: ABNORMAL

## 2018-12-08 LAB
MYCOBACTERIUM SPEC CULT: NORMAL
SPECIMEN SOURCE: NORMAL
SPECIMEN SOURCE: NORMAL

## 2018-12-14 ENCOUNTER — ANCILLARY PROCEDURE (OUTPATIENT)
Dept: CT IMAGING | Facility: CLINIC | Age: 68
End: 2018-12-14
Attending: INTERNAL MEDICINE
Payer: MEDICARE

## 2018-12-14 DIAGNOSIS — R91.1 SOLITARY PULMONARY NODULE: ICD-10-CM

## 2018-12-14 DIAGNOSIS — J18.9 PNEUMONIA: ICD-10-CM

## 2018-12-14 DIAGNOSIS — A31.0 PULMONARY MYCOBACTERIAL INFECTION (H): ICD-10-CM

## 2021-08-02 ENCOUNTER — ANCILLARY PROCEDURE (OUTPATIENT)
Dept: GENERAL RADIOLOGY | Facility: CLINIC | Age: 71
End: 2021-08-02
Attending: FAMILY MEDICINE
Payer: COMMERCIAL

## 2021-08-02 ENCOUNTER — OFFICE VISIT (OUTPATIENT)
Dept: URGENT CARE | Facility: URGENT CARE | Age: 71
End: 2021-08-02
Payer: COMMERCIAL

## 2021-08-02 VITALS
DIASTOLIC BLOOD PRESSURE: 66 MMHG | OXYGEN SATURATION: 95 % | TEMPERATURE: 97.7 F | HEART RATE: 99 BPM | SYSTOLIC BLOOD PRESSURE: 146 MMHG

## 2021-08-02 DIAGNOSIS — S69.91XA HAND INJURY, RIGHT, INITIAL ENCOUNTER: Primary | ICD-10-CM

## 2021-08-02 DIAGNOSIS — R51.9 FACE PAIN: ICD-10-CM

## 2021-08-02 DIAGNOSIS — S69.91XA WRIST INJURY, RIGHT, INITIAL ENCOUNTER: ICD-10-CM

## 2021-08-02 PROCEDURE — 70140 X-RAY EXAM OF FACIAL BONES: CPT | Performed by: RADIOLOGY

## 2021-08-02 PROCEDURE — 73130 X-RAY EXAM OF HAND: CPT | Mod: RT | Performed by: RADIOLOGY

## 2021-08-02 PROCEDURE — 73110 X-RAY EXAM OF WRIST: CPT | Mod: RT | Performed by: RADIOLOGY

## 2021-08-02 PROCEDURE — 99203 OFFICE O/P NEW LOW 30 MIN: CPT | Performed by: FAMILY MEDICINE

## 2021-08-02 NOTE — PROGRESS NOTES
Chief Complaint   Patient presents with     Urgent Care     Fall     fell at gas station on saturday night, hurt left knee, right wrist, mouth       Medical Decision Making:    ASSESMENT AND PLAN   Stu was seen today for urgent care and fall.    Diagnoses and all orders for this visit:    Hand injury, right, initial encounter  -     XR Wrist Right G/E 3 Views    Wrist injury, right, initial encounter  -     XR Hand Right G/E 3 Views    Face pain  -     XR Facial Bones 1/2 Views      Spend more than 35  mins with patient     Tylenol for the pain   Discussed with patient in details about the results  Advised patient to continue symptomatic treatment.  Her blood pressure did improve after the first check  Advised to follow-up with dentist in regards to the loose incisor in the upper gum area  I have reviewed the nursing notes.    Differential Diagnosis:  MS Injury Pain: sprain, fracture, muscle strain and contusion      Review of the result(s) of each unique test -    X-Ray was done, my findings are:degenerative changes     Time  spent on the date of the encounter doing chart review, review of test results, interpretation of tests, patient visit and documentation         see orders in Epic  Pt verbalized and agreed with the plan and is aware of the worsening symptoms for which would need to follow up .  Pt was stable during time of discharge from the clinic     SUBJECTIVE     Stu Pastrana is a 70 year old female presenting with a chief complaint of    Chief Complaint   Patient presents with     Urgent Care     Fall     fell at gas station on saturday night, hurt left knee, right wrist, mouth     MS Injury/Pain    Onset of symptoms was 2 day(s) ago.  Location: right wrist and hand  Context:       The injury happened while at a store shopping when getting a crate of drinks down she bumped into a crate and fell down face forward.  She did not hit her head.  Following that she has been noticing right wrist pain right hand  pain and also did hit her left knee has minor abrasion over the anterior aspect of the left knee but there is no pain with walking.  The fall she also has some tenderness over the upper gum area with some loose tooth.      Mechanism: fall       Patient experienced immediate pain, delayed pain  Course of symptoms is waxing and waning.    Severity moderate  Current and Associated symptoms: Pain and Tenderness  Denies  Redness  Aggravating Factors: weight-bearing, movement and flexion/extension  Therapies to improve symptoms include: none  This is the first time this type of problem has occurred for this patient.           History reviewed. No pertinent past medical history.  Current Outpatient Medications   Medication Sig Dispense Refill     acetaminophen (TYLENOL) 325 MG tablet Take 325-650 mg by mouth every 8 hours as needed for mild pain       albuterol (PROAIR HFA) 108 (90 Base) MCG/ACT inhaler Inhale 2 puffs into the lungs every 4 hours as needed for shortness of breath / dyspnea or wheezing 1 Inhaler 3     nicotine polacrilex (NICORETTE) 4 MG gum Place 1 each (4 mg) inside cheek every 2 hours as needed for other (nicotine withdrawal symptoms) 100 each 3     Social History     Tobacco Use     Smoking status: Current Every Day Smoker     Packs/day: 1.00     Smokeless tobacco: Never Used   Substance Use Topics     Alcohol use: No     Comment: rare use     History reviewed. No pertinent family history.      ROS:    10 point ROS of systems including Constitutional, Eyes, Respiratory, Cardiovascular, Gastroenterology, Genitourinary, Integumentary,  Psychiatric ,neurological were all negative except for pertinent positives noted in my HPI         OBJECTIVE:    BP (!) 146/66   Pulse 99   Temp 97.7  F (36.5  C) (Tympanic)   SpO2 95%   GENERAL APPEARANCE: healthy, alert and no distress  EYES: EOMI,  PERRL, conjunctiva clear  HENT: ear canals and TM's normal.  Nose and mouth without ulcers, erythema or lesions  Face  there is tenderness over the upper gum area  RESP: lungs clear to auscultation - no rales, rhonchi or wheezes  CV: regular rates and rhythm, normal S1 S2, no murmur noted  MS: right sidewrist there is tenderness over the needle of the wrist and hand tenderness to palpation over the first digit and first metacarpal bone  NEURO: Normal strength and tone, sensory exam grossly normal,  normal speech and mentation  SKIN: no suspicious lesions or rashes  PSYCH: mentation appears normal  Physical Exam      (Note was completed, in part, with [employer] voice-recognition software. Documentation reviewed, but some grammatical, spelling, and word errors may remain.)  Amina Sage MD on 8/2/2021 at 7:48 PM